# Patient Record
Sex: FEMALE | Race: WHITE
[De-identification: names, ages, dates, MRNs, and addresses within clinical notes are randomized per-mention and may not be internally consistent; named-entity substitution may affect disease eponyms.]

---

## 2018-09-25 ENCOUNTER — HOSPITAL ENCOUNTER (OUTPATIENT)
Age: 23
End: 2018-09-25
Payer: COMMERCIAL

## 2018-09-25 DIAGNOSIS — Z34.90: Primary | ICD-10-CM

## 2018-09-25 PROCEDURE — 87086 URINE CULTURE/COLONY COUNT: CPT

## 2018-09-25 PROCEDURE — 87088 URINE BACTERIA CULTURE: CPT

## 2018-12-06 ENCOUNTER — HOSPITAL ENCOUNTER (OUTPATIENT)
Age: 23
End: 2018-12-06
Payer: MEDICAID

## 2018-12-06 VITALS — BODY MASS INDEX: 23.9 KG/M2

## 2018-12-06 DIAGNOSIS — Z34.90: Primary | ICD-10-CM

## 2018-12-06 LAB
DEPRECATED RDW RBC: 41.4 FL (ref 35.1–43.9)
DIFFERENTIAL INDICATED: (no result)
ERYTHROCYTE [DISTWIDTH] IN BLOOD: 14 % (ref 11.6–14.6)
HCT VFR BLD AUTO: 36.4 % (ref 37–47)
HEMOGLOBIN: 12 G/DL (ref 12–15)
HGB BLD-MCNC: 12 G/DL (ref 12–15)
IMMATURE GRANULOCYTES COUNT: 0.1 X10^3/UL (ref 0–0)
MCV RBC: 81.3 FL (ref 81–99)
MEAN CORP HGB CONC: 33 G/GL (ref 32–36)
MEAN PLATELET VOL.: 9.7 FL (ref 6.2–12)
PLATELET # BLD: 228 K/MM3 (ref 150–450)
PLATELET COUNT: 228 K/MM3 (ref 150–450)
POSITIVE COUNT: NO
POSITIVE DIFFERENTIAL: NO
POSITIVE MORPHOLOGY: NO
RBC # BLD AUTO: 4.48 M/MM3 (ref 4.2–5.4)
RBC DISTRIBUTION WIDTH CV: 14 % (ref 11.6–14.6)
RBC DISTRIBUTION WIDTH SD: 41.4 FL (ref 35.1–43.9)
WBC # BLD AUTO: 11.8 K/MM3 (ref 4.4–11)
WHITE BLOOD COUNT: 11.8 K/MM3 (ref 4.4–11)

## 2018-12-06 PROCEDURE — 36415 COLL VENOUS BLD VENIPUNCTURE: CPT

## 2018-12-06 PROCEDURE — 86703 HIV-1/HIV-2 1 RESULT ANTBDY: CPT

## 2018-12-06 PROCEDURE — 85025 COMPLETE CBC W/AUTO DIFF WBC: CPT

## 2018-12-06 PROCEDURE — 86762 RUBELLA ANTIBODY: CPT

## 2018-12-06 PROCEDURE — 86900 BLOOD TYPING SEROLOGIC ABO: CPT

## 2018-12-06 PROCEDURE — 86850 RBC ANTIBODY SCREEN: CPT

## 2018-12-06 PROCEDURE — 86592 SYPHILIS TEST NON-TREP QUAL: CPT

## 2018-12-06 PROCEDURE — 87340 HEPATITIS B SURFACE AG IA: CPT

## 2018-12-07 LAB — RAPID PLASMIN REAGIN (RPR): NONREACTIVE

## 2019-01-07 ENCOUNTER — HOSPITAL ENCOUNTER (OUTPATIENT)
Age: 24
End: 2019-01-07
Payer: MEDICAID

## 2019-01-07 VITALS — BODY MASS INDEX: 23.9 KG/M2

## 2019-01-07 DIAGNOSIS — Z34.90: Primary | ICD-10-CM

## 2019-01-07 LAB — SAMPLE ADEQUACY CONTROL: (no result)

## 2019-01-07 PROCEDURE — 87591 N.GONORRHOEAE DNA AMP PROB: CPT

## 2019-01-07 PROCEDURE — 87491 CHLMYD TRACH DNA AMP PROBE: CPT

## 2019-02-06 ENCOUNTER — HOSPITAL ENCOUNTER (OUTPATIENT)
Age: 24
End: 2019-02-06
Payer: MEDICAID

## 2019-02-06 VITALS — BODY MASS INDEX: 23.9 KG/M2

## 2019-02-06 DIAGNOSIS — Z34.90: Primary | ICD-10-CM

## 2019-02-06 LAB
CELLS COUNTED: 100
DEPRECATED RDW RBC: 40.4 FL (ref 35.1–43.9)
DIFFERENTIAL INDICATED: MANUAL DIFF
ERYTHROCYTE [DISTWIDTH] IN BLOOD: 13.2 % (ref 11.6–14.6)
GLUCOSE 1H P 50 G GLC PO SERPL-MCNC: 106 MG/DL (ref 70–140)
HCT VFR BLD AUTO: 35.7 % (ref 37–47)
HEMOGLOBIN: 11.4 G/DL (ref 12–15)
HGB BLD-MCNC: 11.4 G/DL (ref 12–15)
MCV RBC: 84.4 FL (ref 81–99)
MEAN CORP HGB CONC: 31.9 G/GL (ref 32–36)
MEAN PLATELET VOL.: 10.6 FL (ref 6.2–12)
NEUTROPHIL-SEGMENTED: 78 % (ref 47–70)
PLATELET # BLD: 240 K/MM3 (ref 150–450)
PLATELET COUNT: 240 K/MM3 (ref 150–450)
POSITIVE COUNT: YES
POSITIVE DIFFERENTIAL: NO
POSITIVE MORPHOLOGY: YES
RBC # BLD AUTO: 4.23 M/MM3 (ref 4.2–5.4)
RBC DISTRIBUTION WIDTH CV: 13.2 % (ref 11.6–14.6)
RBC DISTRIBUTION WIDTH SD: 40.4 FL (ref 35.1–43.9)
RBC MORPH BLD: (no result) NORMAL
RED CELL MORPHOLOGY: (no result) NORMAL
TOTAL CELLS COUNTED: 100
WBC # BLD AUTO: 14.4 K/MM3 (ref 4.4–11)
WHITE BLOOD COUNT: 14.4 K/MM3 (ref 4.4–11)

## 2019-02-06 PROCEDURE — 86900 BLOOD TYPING SEROLOGIC ABO: CPT

## 2019-02-06 PROCEDURE — 85025 COMPLETE CBC W/AUTO DIFF WBC: CPT

## 2019-02-06 PROCEDURE — 36415 COLL VENOUS BLD VENIPUNCTURE: CPT

## 2019-02-06 PROCEDURE — 86850 RBC ANTIBODY SCREEN: CPT

## 2019-02-06 PROCEDURE — 82950 GLUCOSE TEST: CPT

## 2019-03-20 ENCOUNTER — HOSPITAL ENCOUNTER (OUTPATIENT)
Age: 24
End: 2019-03-20
Payer: COMMERCIAL

## 2019-03-20 VITALS
OXYGEN SATURATION: 99 % | SYSTOLIC BLOOD PRESSURE: 108 MMHG | RESPIRATION RATE: 16 BRPM | TEMPERATURE: 98.2 F | DIASTOLIC BLOOD PRESSURE: 75 MMHG | HEART RATE: 132 BPM

## 2019-03-20 VITALS
SYSTOLIC BLOOD PRESSURE: 110 MMHG | RESPIRATION RATE: 16 BRPM | DIASTOLIC BLOOD PRESSURE: 64 MMHG | HEART RATE: 104 BPM | OXYGEN SATURATION: 99 %

## 2019-03-20 VITALS — BODY MASS INDEX: 29.2 KG/M2 | BODY MASS INDEX: 23.9 KG/M2

## 2019-03-20 DIAGNOSIS — E86.0: Primary | ICD-10-CM

## 2019-03-20 PROCEDURE — 96361 HYDRATE IV INFUSION ADD-ON: CPT

## 2019-03-20 PROCEDURE — A4216 STERILE WATER/SALINE, 10 ML: HCPCS

## 2019-03-20 PROCEDURE — 96374 THER/PROPH/DIAG INJ IV PUSH: CPT

## 2019-03-20 PROCEDURE — 96375 TX/PRO/DX INJ NEW DRUG ADDON: CPT

## 2019-04-23 ENCOUNTER — HOSPITAL ENCOUNTER (OUTPATIENT)
Dept: HOSPITAL 100 - LABSPEC | Age: 24
Discharge: HOME | End: 2019-04-23
Payer: MEDICAID

## 2019-04-23 VITALS — BODY MASS INDEX: 30.2 KG/M2

## 2019-04-23 DIAGNOSIS — Z34.90: Primary | ICD-10-CM

## 2019-04-23 PROCEDURE — 87081 CULTURE SCREEN ONLY: CPT

## 2019-04-24 ENCOUNTER — HOSPITAL ENCOUNTER (OUTPATIENT)
Dept: HOSPITAL 100 - WPOUT | Age: 24
LOS: 1 days | Discharge: HOME | End: 2019-04-25
Payer: MEDICAID

## 2019-04-24 VITALS — BODY MASS INDEX: 30.5 KG/M2 | BODY MASS INDEX: 30.2 KG/M2

## 2019-04-24 DIAGNOSIS — O47.9: Primary | ICD-10-CM

## 2019-04-24 DIAGNOSIS — Z3A.00: ICD-10-CM

## 2019-04-24 PROCEDURE — G0378 HOSPITAL OBSERVATION PER HR: HCPCS

## 2019-04-24 PROCEDURE — 99218: CPT

## 2019-04-24 PROCEDURE — 59025 FETAL NON-STRESS TEST: CPT

## 2019-04-24 PROCEDURE — 59050 FETAL MONITOR W/REPORT: CPT

## 2019-05-09 ENCOUNTER — HOSPITAL ENCOUNTER (INPATIENT)
Dept: HOSPITAL 100 - WP | Age: 24
LOS: 3 days | Discharge: HOME | DRG: 560 | End: 2019-05-12
Payer: MEDICAID

## 2019-05-09 VITALS — BODY MASS INDEX: 30.9 KG/M2 | BODY MASS INDEX: 23.9 KG/M2 | BODY MASS INDEX: 30.5 KG/M2

## 2019-05-09 DIAGNOSIS — N85.8: ICD-10-CM

## 2019-05-09 DIAGNOSIS — O34.211: ICD-10-CM

## 2019-05-09 DIAGNOSIS — Z3A.39: ICD-10-CM

## 2019-05-09 LAB
ANISOCYTOSIS BLD QL SMEAR: (no result)
ANISOCYTOSIS: (no result)
CELLS COUNTED: 100
DEPRECATED RDW RBC: 42 FL (ref 35.1–43.9)
DIFFERENTIAL INDICATED: MANUAL DIFF
ERYTHROCYTE [DISTWIDTH] IN BLOOD: 14.5 % (ref 11.6–14.6)
HCT VFR BLD AUTO: 35.1 % (ref 37–47)
HEMOGLOBIN: 11.3 G/DL (ref 12–15)
HGB BLD-MCNC: 11.3 G/DL (ref 12–15)
MCV RBC: 80 FL (ref 81–99)
MEAN CORP HGB CONC: 32.2 G/GL (ref 32–36)
MEAN PLATELET VOL.: 11.3 FL (ref 6.2–12)
MICROCYTOSIS: (no result)
NEUTROPHIL-BAND: 2 % (ref 0–5)
NEUTROPHIL-SEGMENTED: 82 % (ref 47–70)
PLATELET # BLD: 259 K/MM3 (ref 150–450)
PLATELET COUNT: 259 K/MM3 (ref 150–450)
POSITIVE COUNT: YES
POSITIVE DIFFERENTIAL: NO
POSITIVE MORPHOLOGY: YES
RBC # BLD AUTO: 4.39 M/MM3 (ref 4.2–5.4)
RBC DISTRIBUTION WIDTH CV: 14.5 % (ref 11.6–14.6)
RBC DISTRIBUTION WIDTH SD: 42 FL (ref 35.1–43.9)
ROM INTERNAL CONTROL TEST: (no result)
ROM PATIENT TEST: POSITIVE
TOTAL CELLS COUNTED: 100
WBC # BLD AUTO: 18.4 K/MM3 (ref 4.4–11)
WHITE BLOOD COUNT: 18.4 K/MM3 (ref 4.4–11)

## 2019-05-09 PROCEDURE — 99218: CPT

## 2019-05-09 PROCEDURE — 86900 BLOOD TYPING SEROLOGIC ABO: CPT

## 2019-05-09 PROCEDURE — 85025 COMPLETE CBC W/AUTO DIFF WBC: CPT

## 2019-05-09 PROCEDURE — 59050 FETAL MONITOR W/REPORT: CPT

## 2019-05-09 PROCEDURE — 59025 FETAL NON-STRESS TEST: CPT

## 2019-05-09 PROCEDURE — G0378 HOSPITAL OBSERVATION PER HR: HCPCS

## 2019-05-09 PROCEDURE — 84112 EVAL AMNIOTIC FLUID PROTEIN: CPT

## 2019-05-09 PROCEDURE — 86850 RBC ANTIBODY SCREEN: CPT

## 2019-05-09 RX ADMIN — Medication 1 EACH: at 19:53

## 2019-05-10 VITALS
OXYGEN SATURATION: 97 % | RESPIRATION RATE: 20 BRPM | SYSTOLIC BLOOD PRESSURE: 117 MMHG | TEMPERATURE: 100 F | DIASTOLIC BLOOD PRESSURE: 70 MMHG | HEART RATE: 125 BPM

## 2019-05-10 RX ADMIN — Medication 0 ML: at 03:39

## 2019-05-11 VITALS
RESPIRATION RATE: 16 BRPM | HEART RATE: 94 BPM | TEMPERATURE: 98.24 F | OXYGEN SATURATION: 96 % | DIASTOLIC BLOOD PRESSURE: 56 MMHG | SYSTOLIC BLOOD PRESSURE: 105 MMHG

## 2019-05-11 VITALS
TEMPERATURE: 97.1 F | OXYGEN SATURATION: 98 % | SYSTOLIC BLOOD PRESSURE: 110 MMHG | DIASTOLIC BLOOD PRESSURE: 61 MMHG | RESPIRATION RATE: 18 BRPM | HEART RATE: 94 BPM

## 2019-05-11 VITALS
RESPIRATION RATE: 18 BRPM | HEART RATE: 96 BPM | TEMPERATURE: 99 F | DIASTOLIC BLOOD PRESSURE: 65 MMHG | SYSTOLIC BLOOD PRESSURE: 114 MMHG

## 2019-05-11 VITALS
HEART RATE: 88 BPM | RESPIRATION RATE: 16 BRPM | DIASTOLIC BLOOD PRESSURE: 57 MMHG | OXYGEN SATURATION: 96 % | SYSTOLIC BLOOD PRESSURE: 101 MMHG | TEMPERATURE: 98.1 F

## 2019-05-11 VITALS
TEMPERATURE: 98.7 F | SYSTOLIC BLOOD PRESSURE: 111 MMHG | HEART RATE: 117 BPM | DIASTOLIC BLOOD PRESSURE: 67 MMHG | RESPIRATION RATE: 16 BRPM

## 2019-05-11 VITALS
RESPIRATION RATE: 18 BRPM | TEMPERATURE: 97.34 F | DIASTOLIC BLOOD PRESSURE: 63 MMHG | SYSTOLIC BLOOD PRESSURE: 107 MMHG | HEART RATE: 98 BPM

## 2019-05-11 RX ADMIN — DOCUSATE SODIUM 50 MG AND SENNOSIDES 8.6 MG 0 TABLET: 8.6; 5 TABLET, FILM COATED ORAL at 11:23

## 2019-05-12 VITALS
RESPIRATION RATE: 18 BRPM | TEMPERATURE: 98.5 F | SYSTOLIC BLOOD PRESSURE: 118 MMHG | DIASTOLIC BLOOD PRESSURE: 63 MMHG | HEART RATE: 93 BPM

## 2019-05-12 VITALS
TEMPERATURE: 98.42 F | DIASTOLIC BLOOD PRESSURE: 78 MMHG | HEART RATE: 103 BPM | RESPIRATION RATE: 20 BRPM | SYSTOLIC BLOOD PRESSURE: 118 MMHG

## 2019-05-12 RX ADMIN — DOCUSATE SODIUM 50 MG AND SENNOSIDES 8.6 MG 0 TABLET: 8.6; 5 TABLET, FILM COATED ORAL at 09:36

## 2019-06-21 ENCOUNTER — HOSPITAL ENCOUNTER (OUTPATIENT)
Dept: HOSPITAL 100 - LABSPEC | Age: 24
Discharge: HOME | End: 2019-06-21
Payer: COMMERCIAL

## 2019-06-21 VITALS — BODY MASS INDEX: 30.9 KG/M2

## 2019-06-21 DIAGNOSIS — Z12.4: Primary | ICD-10-CM

## 2019-06-21 PROCEDURE — G0145 SCR C/V CYTO,THINLAYER,RESCR: HCPCS

## 2019-06-21 PROCEDURE — 87624 HPV HI-RISK TYP POOLED RSLT: CPT

## 2019-06-21 PROCEDURE — 88175 CYTOPATH C/V AUTO FLUID REDO: CPT

## 2019-10-16 ENCOUNTER — HOSPITAL ENCOUNTER (OUTPATIENT)
Dept: HOSPITAL 100 - US | Age: 24
End: 2019-10-16
Payer: COMMERCIAL

## 2019-10-16 VITALS — BODY MASS INDEX: 30.9 KG/M2

## 2019-10-16 DIAGNOSIS — N89.8: Primary | ICD-10-CM

## 2019-10-16 LAB — SAMPLE ADEQUACY CONTROL: (no result)

## 2019-10-16 PROCEDURE — 93976 VASCULAR STUDY: CPT

## 2019-10-16 PROCEDURE — 87205 SMEAR GRAM STAIN: CPT

## 2019-10-16 PROCEDURE — 76856 US EXAM PELVIC COMPLETE: CPT

## 2019-10-16 PROCEDURE — 87491 CHLMYD TRACH DNA AMP PROBE: CPT

## 2019-10-16 PROCEDURE — 87070 CULTURE OTHR SPECIMN AEROBIC: CPT

## 2019-10-16 PROCEDURE — 76830 TRANSVAGINAL US NON-OB: CPT

## 2019-10-16 PROCEDURE — 87591 N.GONORRHOEAE DNA AMP PROB: CPT

## 2019-10-16 PROCEDURE — 87077 CULTURE AEROBIC IDENTIFY: CPT

## 2020-06-17 ENCOUNTER — HOSPITAL ENCOUNTER (OUTPATIENT)
Age: 25
End: 2020-06-17
Payer: COMMERCIAL

## 2020-06-17 VITALS — BODY MASS INDEX: 30.9 KG/M2

## 2020-06-17 DIAGNOSIS — Z34.90: Primary | ICD-10-CM

## 2020-06-17 LAB
AMPHET UR-MCNC: NEGATIVE NG/ML
BARBITURATE URINE VISTA: NEGATIVE
BENZODIAZEPINE URINE VISTA: NEGATIVE
COCAINE URINE VISTA: NEGATIVE
DEPRECATED RDW RBC: 41.5 FL (ref 35.1–43.9)
DRUG CONFIRMATION TO FOLLOW?: (no result)
ECSTACY URINE VISTA: NEGATIVE
ERYTHROCYTE [DISTWIDTH] IN BLOOD: 14.1 % (ref 11.6–14.6)
HCT VFR BLD AUTO: 36.3 % (ref 37–47)
HEMOGLOBIN: 11.6 G/DL (ref 12–15)
HGB BLD-MCNC: 11.6 G/DL (ref 12–15)
IMMATURE GRANULOCYTES COUNT: 0.08 X10^3/UL (ref 0–0)
MCV RBC: 81.9 FL (ref 81–99)
MEAN CORP HGB CONC: 32 G/DL (ref 32–36)
MEAN PLATELET VOL.: 10.4 FL (ref 6.2–12)
METHADONE URINE VISTA: NEGATIVE
NRBC FLAGGED BY ANALYZER: 0 % (ref 0–5)
PCP UR QL: NEGATIVE
PH UR: 5 [PH]
PLATELET # BLD: 243 K/MM3 (ref 150–450)
PLATELET COUNT: 243 K/MM3 (ref 150–450)
RAPID PLASMIN REAGIN (RPR): NONREACTIVE
RBC # BLD AUTO: 4.43 M/MM3 (ref 4.2–5.4)
RBC DISTRIBUTION WIDTH CV: 14.1 % (ref 11.6–14.6)
RBC DISTRIBUTION WIDTH SD: 41.5 FL (ref 35.1–43.9)
SAMPLE ADEQUACY CONTROL: (no result)
THC URINE VISTA: NEGATIVE
WBC # BLD AUTO: 10.3 K/MM3 (ref 4.4–11)
WHITE BLOOD COUNT: 10.3 K/MM3 (ref 4.4–11)

## 2020-06-17 PROCEDURE — 36415 COLL VENOUS BLD VENIPUNCTURE: CPT

## 2020-06-17 PROCEDURE — 80307 DRUG TEST PRSMV CHEM ANLYZR: CPT

## 2020-06-17 PROCEDURE — 86850 RBC ANTIBODY SCREEN: CPT

## 2020-06-17 PROCEDURE — 85025 COMPLETE CBC W/AUTO DIFF WBC: CPT

## 2020-06-17 PROCEDURE — 86703 HIV-1/HIV-2 1 RESULT ANTBDY: CPT

## 2020-06-17 PROCEDURE — 86900 BLOOD TYPING SEROLOGIC ABO: CPT

## 2020-06-17 PROCEDURE — 86901 BLOOD TYPING SEROLOGIC RH(D): CPT

## 2020-06-17 PROCEDURE — 87340 HEPATITIS B SURFACE AG IA: CPT

## 2020-06-17 PROCEDURE — 87086 URINE CULTURE/COLONY COUNT: CPT

## 2020-06-17 PROCEDURE — 86762 RUBELLA ANTIBODY: CPT

## 2020-06-17 PROCEDURE — 86803 HEPATITIS C AB TEST: CPT

## 2020-06-17 PROCEDURE — 86592 SYPHILIS TEST NON-TREP QUAL: CPT

## 2020-06-17 PROCEDURE — 87491 CHLMYD TRACH DNA AMP PROBE: CPT

## 2020-06-17 PROCEDURE — 87591 N.GONORRHOEAE DNA AMP PROB: CPT

## 2020-10-12 ENCOUNTER — HOSPITAL ENCOUNTER (OUTPATIENT)
Age: 25
End: 2020-10-12
Payer: COMMERCIAL

## 2020-10-12 VITALS — BODY MASS INDEX: 30.9 KG/M2

## 2020-10-12 DIAGNOSIS — Z13.1: ICD-10-CM

## 2020-10-12 DIAGNOSIS — Z34.90: Primary | ICD-10-CM

## 2020-10-12 LAB
CELLS COUNTED: 100
DEPRECATED RDW RBC: 41.1 FL (ref 35.1–43.9)
DIFFERENTIAL INDICATED: MANUAL DIFF
ERYTHROCYTE [DISTWIDTH] IN BLOOD: 14 % (ref 11.6–14.6)
GLUCOSE 1H P 50 G GLC PO SERPL-MCNC: 128 MG/DL (ref 70–140)
HCT VFR BLD AUTO: 34.7 % (ref 37–47)
HEMOGLOBIN: 10.6 G/DL (ref 12–15)
HGB BLD-MCNC: 10.6 G/DL (ref 12–15)
MCV RBC: 82.6 FL (ref 81–99)
MEAN CORP HGB CONC: 30.5 G/DL (ref 32–36)
MEAN PLATELET VOL.: 10.3 FL (ref 6.2–12)
NEUTROPHIL-BAND: 8 % (ref 0–5)
NEUTROPHIL-SEGMENTED: 59 % (ref 47–70)
PLATELET # BLD: 211 K/MM3 (ref 150–450)
PLATELET COUNT: 211 K/MM3 (ref 150–450)
POSITIVE COUNT: YES
POSITIVE MORPHOLOGY: YES
RBC # BLD AUTO: 4.2 M/MM3 (ref 4.2–5.4)
RBC DISTRIBUTION WIDTH CV: 14 % (ref 11.6–14.6)
RBC DISTRIBUTION WIDTH SD: 41.1 FL (ref 35.1–43.9)
RBC MORPH BLD: (no result) NORMAL
RED CELL MORPHOLOGY: (no result) NORMAL
TOTAL CELLS COUNTED: 100
WBC # BLD AUTO: 10.8 K/MM3 (ref 4.4–11)
WHITE BLOOD COUNT: 10.8 K/MM3 (ref 4.4–11)

## 2020-10-12 PROCEDURE — 36415 COLL VENOUS BLD VENIPUNCTURE: CPT

## 2020-10-12 PROCEDURE — 82950 GLUCOSE TEST: CPT

## 2020-10-12 PROCEDURE — 85025 COMPLETE CBC W/AUTO DIFF WBC: CPT

## 2020-11-18 ENCOUNTER — HOSPITAL ENCOUNTER (OUTPATIENT)
Age: 25
End: 2020-11-18
Payer: COMMERCIAL

## 2020-11-18 VITALS — BODY MASS INDEX: 32.6 KG/M2

## 2020-11-18 DIAGNOSIS — Z3A.00: ICD-10-CM

## 2020-11-18 DIAGNOSIS — O99.013: Primary | ICD-10-CM

## 2020-11-18 LAB
CELLS COUNTED: 100
DEPRECATED RDW RBC: 47.2 FL (ref 35.1–43.9)
DIFFERENTIAL INDICATED: MANUAL DIFF
ERYTHROCYTE [DISTWIDTH] IN BLOOD: 15.6 % (ref 11.6–14.6)
HCT VFR BLD AUTO: 36.2 % (ref 37–47)
HEMOGLOBIN: 11.1 G/DL (ref 12–15)
HGB BLD-MCNC: 11.1 G/DL (ref 12–15)
MCV RBC: 83.2 FL (ref 81–99)
MEAN CORP HGB CONC: 30.7 G/DL (ref 32–36)
MEAN PLATELET VOL.: 10.5 FL (ref 6.2–12)
NEUTROPHIL-BAND: 1 % (ref 0–5)
NEUTROPHIL-SEGMENTED: 46 % (ref 47–70)
PLATELET # BLD: 201 K/MM3 (ref 150–450)
PLATELET COUNT: 201 K/MM3 (ref 150–450)
POSITIVE COUNT: YES
POSITIVE MORPHOLOGY: YES
RBC # BLD AUTO: 4.35 M/MM3 (ref 4.2–5.4)
RBC DISTRIBUTION WIDTH CV: 15.6 % (ref 11.6–14.6)
RBC DISTRIBUTION WIDTH SD: 47.2 FL (ref 35.1–43.9)
RBC MORPH BLD: (no result) NORMAL
RED CELL MORPHOLOGY: (no result) NORMAL
TOTAL CELLS COUNTED: 100
WBC # BLD AUTO: 12.7 K/MM3 (ref 4.4–11)
WHITE BLOOD COUNT: 12.7 K/MM3 (ref 4.4–11)

## 2020-11-18 PROCEDURE — 36415 COLL VENOUS BLD VENIPUNCTURE: CPT

## 2020-11-18 PROCEDURE — 85025 COMPLETE CBC W/AUTO DIFF WBC: CPT

## 2020-12-04 ENCOUNTER — HOSPITAL ENCOUNTER (OUTPATIENT)
Dept: HOSPITAL 100 - WPOUT | Age: 25
Discharge: HOME | End: 2020-12-04
Payer: COMMERCIAL

## 2020-12-04 VITALS — BODY MASS INDEX: 33 KG/M2 | BODY MASS INDEX: 33.5 KG/M2

## 2020-12-04 DIAGNOSIS — O26.893: Primary | ICD-10-CM

## 2020-12-04 DIAGNOSIS — Z3A.35: ICD-10-CM

## 2020-12-04 DIAGNOSIS — R51.9: ICD-10-CM

## 2020-12-04 DIAGNOSIS — H53.8: ICD-10-CM

## 2020-12-04 DIAGNOSIS — O23.43: ICD-10-CM

## 2020-12-04 LAB
ALANINE AMINOTRANSFER ALT/SGPT: 14 U/L (ref 13–56)
AST(SGOT): 9 U/L (ref 15–37)
CREAT UR-MCNC: 29.2 MG/DL
DEPRECATED RDW RBC: 45.4 FL (ref 35.1–43.9)
ERYTHROCYTE [DISTWIDTH] IN BLOOD: 15.2 % (ref 11.6–14.6)
EST GLOM FILT RATE - AFR AMER: 254 ML/MIN (ref 60–?)
HCT VFR BLD AUTO: 34.5 % (ref 37–47)
HEMOGLOBIN: 11 G/DL (ref 12–15)
HGB BLD-MCNC: 11 G/DL (ref 12–15)
LEUKOCYTE ESTERASE UR QL STRIP: 500 /UL
MCV RBC: 82.3 FL (ref 81–99)
MEAN CORP HGB CONC: 31.9 G/DL (ref 32–36)
MEAN PLATELET VOL.: 10.5 FL (ref 6.2–12)
PLATELET # BLD: 206 K/MM3 (ref 150–450)
PLATELET COUNT: 206 K/MM3 (ref 150–450)
PROT UR-MCNC: < 6 MG/DL (ref ?–11.9)
PROT/CREAT UR: (no result) MG/G CRE (ref 0–200)
RBC # BLD AUTO: 4.19 M/MM3 (ref 4.2–5.4)
RBC DISTRIBUTION WIDTH CV: 15.2 % (ref 11.6–14.6)
RBC DISTRIBUTION WIDTH SD: 45.4 FL (ref 35.1–43.9)
SP GR UR: 1 (ref 1–1.03)
URATE SERPL-MCNC: 3.7 MG/DL (ref 2.6–6)
URINE PRESERVATIVE: (no result)
WBC # BLD AUTO: 11.6 K/MM3 (ref 4.4–11)
WHITE BLOOD COUNT: 11.6 K/MM3 (ref 4.4–11)

## 2020-12-04 PROCEDURE — 84550 ASSAY OF BLOOD/URIC ACID: CPT

## 2020-12-04 PROCEDURE — 85027 COMPLETE CBC AUTOMATED: CPT

## 2020-12-04 PROCEDURE — G0378 HOSPITAL OBSERVATION PER HR: HCPCS

## 2020-12-04 PROCEDURE — 82570 ASSAY OF URINE CREATININE: CPT

## 2020-12-04 PROCEDURE — 59050 FETAL MONITOR W/REPORT: CPT

## 2020-12-04 PROCEDURE — 59025 FETAL NON-STRESS TEST: CPT

## 2020-12-04 PROCEDURE — 82565 ASSAY OF CREATININE: CPT

## 2020-12-04 PROCEDURE — 84460 ALANINE AMINO (ALT) (SGPT): CPT

## 2020-12-04 PROCEDURE — 84450 TRANSFERASE (AST) (SGOT): CPT

## 2020-12-04 PROCEDURE — 81002 URINALYSIS NONAUTO W/O SCOPE: CPT

## 2020-12-04 PROCEDURE — 36415 COLL VENOUS BLD VENIPUNCTURE: CPT

## 2020-12-04 PROCEDURE — 99218: CPT

## 2020-12-04 PROCEDURE — 84156 ASSAY OF PROTEIN URINE: CPT

## 2020-12-17 ENCOUNTER — HOSPITAL ENCOUNTER (OUTPATIENT)
Dept: HOSPITAL 100 - LABSPEC | Age: 25
Discharge: HOME | End: 2020-12-17
Payer: COMMERCIAL

## 2020-12-17 VITALS — BODY MASS INDEX: 33.6 KG/M2

## 2020-12-17 DIAGNOSIS — Z3A.00: ICD-10-CM

## 2020-12-17 DIAGNOSIS — O23.40: Primary | ICD-10-CM

## 2020-12-17 PROCEDURE — 87086 URINE CULTURE/COLONY COUNT: CPT

## 2020-12-17 PROCEDURE — 87081 CULTURE SCREEN ONLY: CPT

## 2021-01-25 ENCOUNTER — HOSPITAL ENCOUNTER (OUTPATIENT)
Dept: HOSPITAL 100 - LABSPEC | Age: 26
Discharge: HOME | End: 2021-01-25
Payer: COMMERCIAL

## 2021-01-25 VITALS — BODY MASS INDEX: 33.6 KG/M2

## 2021-01-25 DIAGNOSIS — N39.0: Primary | ICD-10-CM

## 2021-01-25 PROCEDURE — 87086 URINE CULTURE/COLONY COUNT: CPT

## 2021-01-25 PROCEDURE — 87088 URINE BACTERIA CULTURE: CPT

## 2021-08-09 ENCOUNTER — HOSPITAL ENCOUNTER (OUTPATIENT)
Age: 26
Discharge: HOME | End: 2021-08-09
Payer: COMMERCIAL

## 2021-08-09 VITALS — BODY MASS INDEX: 33.6 KG/M2

## 2021-08-09 DIAGNOSIS — R30.9: Primary | ICD-10-CM

## 2021-08-09 DIAGNOSIS — R10.2: ICD-10-CM

## 2021-08-09 PROCEDURE — 87088 URINE BACTERIA CULTURE: CPT

## 2021-08-09 PROCEDURE — 87086 URINE CULTURE/COLONY COUNT: CPT

## 2021-08-09 PROCEDURE — 87070 CULTURE OTHR SPECIMN AEROBIC: CPT

## 2021-08-09 PROCEDURE — 87205 SMEAR GRAM STAIN: CPT

## 2022-02-16 ENCOUNTER — HOSPITAL ENCOUNTER (OUTPATIENT)
Age: 27
Discharge: HOME | End: 2022-02-16
Payer: COMMERCIAL

## 2022-02-16 DIAGNOSIS — N91.2: Primary | ICD-10-CM

## 2022-02-16 LAB — HCG SERPL QL: < 1 MIU/ML (ref 1–3)

## 2022-02-16 PROCEDURE — 84702 CHORIONIC GONADOTROPIN TEST: CPT

## 2022-02-16 PROCEDURE — 36415 COLL VENOUS BLD VENIPUNCTURE: CPT

## 2022-04-13 ENCOUNTER — HOSPITAL ENCOUNTER (OUTPATIENT)
Dept: HOSPITAL 100 - LAB | Age: 27
Discharge: HOME | End: 2022-04-13
Payer: COMMERCIAL

## 2022-04-13 DIAGNOSIS — N92.6: Primary | ICD-10-CM

## 2022-04-13 LAB — HCG SERPL QL: 9 MIU/ML (ref 1–3)

## 2022-04-13 PROCEDURE — 84702 CHORIONIC GONADOTROPIN TEST: CPT

## 2022-04-15 ENCOUNTER — HOSPITAL ENCOUNTER (OUTPATIENT)
Dept: HOSPITAL 100 - PAVLAB | Age: 27
Discharge: HOME | End: 2022-04-15
Payer: COMMERCIAL

## 2022-04-15 DIAGNOSIS — N91.2: Primary | ICD-10-CM

## 2022-04-15 LAB — HCG SERPL QL: 39 MIU/ML (ref 1–3)

## 2022-04-15 PROCEDURE — 84702 CHORIONIC GONADOTROPIN TEST: CPT

## 2022-04-15 PROCEDURE — 36415 COLL VENOUS BLD VENIPUNCTURE: CPT

## 2022-05-05 ENCOUNTER — HOSPITAL ENCOUNTER (OUTPATIENT)
Dept: HOSPITAL 100 - PAVLAB | Age: 27
Discharge: HOME | End: 2022-05-05
Payer: COMMERCIAL

## 2022-05-05 DIAGNOSIS — N91.2: Primary | ICD-10-CM

## 2022-05-05 LAB — HCG SERPL QL: 788 MIU/ML (ref 1–3)

## 2022-05-05 PROCEDURE — 36415 COLL VENOUS BLD VENIPUNCTURE: CPT

## 2022-05-05 PROCEDURE — 84702 CHORIONIC GONADOTROPIN TEST: CPT

## 2022-05-09 ENCOUNTER — HOSPITAL ENCOUNTER (OUTPATIENT)
Dept: HOSPITAL 100 - PAVLAB | Age: 27
Discharge: HOME | End: 2022-05-09
Payer: COMMERCIAL

## 2022-05-09 DIAGNOSIS — N91.2: Primary | ICD-10-CM

## 2022-05-09 LAB — HCG SERPL QL: 75 MIU/ML (ref 1–3)

## 2022-05-09 PROCEDURE — 36415 COLL VENOUS BLD VENIPUNCTURE: CPT

## 2022-05-09 PROCEDURE — 84702 CHORIONIC GONADOTROPIN TEST: CPT

## 2022-05-17 ENCOUNTER — HOSPITAL ENCOUNTER (OUTPATIENT)
Dept: HOSPITAL 100 - PAVLAB | Age: 27
Discharge: HOME | End: 2022-05-17
Payer: COMMERCIAL

## 2022-05-17 DIAGNOSIS — O03.9: Primary | ICD-10-CM

## 2022-05-17 LAB — HCG SERPL QL: 5 MIU/ML (ref 1–3)

## 2022-05-17 PROCEDURE — 36415 COLL VENOUS BLD VENIPUNCTURE: CPT

## 2022-05-17 PROCEDURE — 84702 CHORIONIC GONADOTROPIN TEST: CPT

## 2022-10-20 ENCOUNTER — HOSPITAL ENCOUNTER (OUTPATIENT)
Dept: HOSPITAL 100 - LABSPEC | Age: 27
Discharge: HOME | End: 2022-10-20
Payer: COMMERCIAL

## 2022-10-20 DIAGNOSIS — Z12.4: Primary | ICD-10-CM

## 2022-10-20 PROCEDURE — G0145 SCR C/V CYTO,THINLAYER,RESCR: HCPCS

## 2022-10-20 PROCEDURE — 88175 CYTOPATH C/V AUTO FLUID REDO: CPT

## 2023-02-08 ENCOUNTER — HOSPITAL ENCOUNTER (OUTPATIENT)
Dept: HOSPITAL 100 - LAB | Age: 28
Discharge: HOME | End: 2023-02-08
Payer: COMMERCIAL

## 2023-02-08 DIAGNOSIS — N91.2: Primary | ICD-10-CM

## 2023-02-08 LAB — HCG SERPL QL: 26 MIU/ML (ref 1–3)

## 2023-02-08 PROCEDURE — 84702 CHORIONIC GONADOTROPIN TEST: CPT

## 2023-02-08 PROCEDURE — 36415 COLL VENOUS BLD VENIPUNCTURE: CPT

## 2023-02-10 ENCOUNTER — HOSPITAL ENCOUNTER (OUTPATIENT)
Dept: HOSPITAL 100 - LAB | Age: 28
Discharge: HOME | End: 2023-02-10
Payer: COMMERCIAL

## 2023-02-10 DIAGNOSIS — N91.2: Primary | ICD-10-CM

## 2023-02-10 LAB — HCG SERPL QL: 107 MIU/ML (ref 1–3)

## 2023-02-10 PROCEDURE — 84702 CHORIONIC GONADOTROPIN TEST: CPT

## 2023-02-10 PROCEDURE — 36415 COLL VENOUS BLD VENIPUNCTURE: CPT

## 2023-02-16 ENCOUNTER — HOSPITAL ENCOUNTER (OUTPATIENT)
Dept: HOSPITAL 100 - US | Age: 28
Discharge: HOME | End: 2023-02-16
Payer: COMMERCIAL

## 2023-02-16 DIAGNOSIS — N91.2: Primary | ICD-10-CM

## 2023-02-16 PROCEDURE — 76817 TRANSVAGINAL US OBSTETRIC: CPT

## 2023-02-18 ENCOUNTER — HOSPITAL ENCOUNTER (OUTPATIENT)
Age: 28
Discharge: HOME | End: 2023-02-18
Payer: COMMERCIAL

## 2023-02-18 DIAGNOSIS — N91.2: Primary | ICD-10-CM

## 2023-02-18 LAB — HCG SERPL QL: 4001 MIU/ML (ref 1–3)

## 2023-02-18 PROCEDURE — 84702 CHORIONIC GONADOTROPIN TEST: CPT

## 2023-02-20 ENCOUNTER — HOSPITAL ENCOUNTER (OUTPATIENT)
Dept: HOSPITAL 100 - LAB | Age: 28
Discharge: HOME | End: 2023-02-20
Payer: COMMERCIAL

## 2023-02-20 DIAGNOSIS — N91.2: Primary | ICD-10-CM

## 2023-02-20 LAB — HCG SERPL QL: 7284 MIU/ML (ref 1–3)

## 2023-02-20 PROCEDURE — 84702 CHORIONIC GONADOTROPIN TEST: CPT

## 2023-02-20 PROCEDURE — 36415 COLL VENOUS BLD VENIPUNCTURE: CPT

## 2023-03-01 ENCOUNTER — HOSPITAL ENCOUNTER (OUTPATIENT)
Dept: HOSPITAL 100 - US | Age: 28
Discharge: HOME | End: 2023-03-01
Payer: COMMERCIAL

## 2023-03-01 DIAGNOSIS — N91.2: Primary | ICD-10-CM

## 2023-03-01 PROCEDURE — 76817 TRANSVAGINAL US OBSTETRIC: CPT

## 2023-03-02 ENCOUNTER — HOSPITAL ENCOUNTER (OUTPATIENT)
Dept: HOSPITAL 100 - LABSPEC | Age: 28
Discharge: HOME | End: 2023-03-02
Payer: COMMERCIAL

## 2023-03-02 DIAGNOSIS — Z34.90: Primary | ICD-10-CM

## 2023-03-02 PROCEDURE — 87491 CHLMYD TRACH DNA AMP PROBE: CPT

## 2023-03-02 PROCEDURE — 87591 N.GONORRHOEAE DNA AMP PROB: CPT

## 2023-03-30 ENCOUNTER — HOSPITAL ENCOUNTER (OUTPATIENT)
Dept: HOSPITAL 100 - PAVLAB | Age: 28
Discharge: HOME | End: 2023-03-30
Payer: COMMERCIAL

## 2023-03-30 DIAGNOSIS — Z13.1: ICD-10-CM

## 2023-03-30 DIAGNOSIS — O09.90: Primary | ICD-10-CM

## 2023-03-30 DIAGNOSIS — Z3A.00: ICD-10-CM

## 2023-03-30 LAB
DEPRECATED RDW RBC: 41.3 FL (ref 35.1–43.9)
ERYTHROCYTE [DISTWIDTH] IN BLOOD: 14.3 % (ref 11.6–14.6)
GLUCOSE 1H P 50 G GLC PO SERPL-MCNC: 167 MG/DL (ref 70–140)
HCT VFR BLD AUTO: 35.5 % (ref 37–47)
HEMOGLOBIN: 11.3 G/DL (ref 12–15)
HGB BLD-MCNC: 11.3 G/DL (ref 12–15)
IMMATURE GRANULOCYTES COUNT: 0.08 X10^3/UL (ref 0–0)
MCV RBC: 78.9 FL (ref 81–99)
MEAN CORP HGB CONC: 31.8 G/DL (ref 32–36)
MEAN PLATELET VOL.: 9.9 FL (ref 6.2–12)
NRBC FLAGGED BY ANALYZER: 0 % (ref 0–5)
PLATELET # BLD: 240 K/MM3 (ref 150–450)
PLATELET COUNT: 240 K/MM3 (ref 150–450)
PROGEST SERPL-MCNC: 12.69 NG/ML
RBC # BLD AUTO: 4.5 M/MM3 (ref 4.2–5.4)
RBC DISTRIBUTION WIDTH CV: 14.3 % (ref 11.6–14.6)
RBC DISTRIBUTION WIDTH SD: 41.3 FL (ref 35.1–43.9)
WBC # BLD AUTO: 8.3 K/MM3 (ref 4.4–11)
WHITE BLOOD COUNT: 8.3 K/MM3 (ref 4.4–11)

## 2023-03-30 PROCEDURE — 87086 URINE CULTURE/COLONY COUNT: CPT

## 2023-03-30 PROCEDURE — 87088 URINE BACTERIA CULTURE: CPT

## 2023-03-30 PROCEDURE — 85025 COMPLETE CBC W/AUTO DIFF WBC: CPT

## 2023-03-30 PROCEDURE — 86780 TREPONEMA PALLIDUM: CPT

## 2023-03-30 PROCEDURE — 86850 RBC ANTIBODY SCREEN: CPT

## 2023-03-30 PROCEDURE — 82950 GLUCOSE TEST: CPT

## 2023-03-30 PROCEDURE — 36415 COLL VENOUS BLD VENIPUNCTURE: CPT

## 2023-03-30 PROCEDURE — 84144 ASSAY OF PROGESTERONE: CPT

## 2023-03-30 PROCEDURE — 86703 HIV-1/HIV-2 1 RESULT ANTBDY: CPT

## 2023-03-30 PROCEDURE — 86762 RUBELLA ANTIBODY: CPT

## 2023-03-30 PROCEDURE — 86803 HEPATITIS C AB TEST: CPT

## 2023-03-30 PROCEDURE — 87340 HEPATITIS B SURFACE AG IA: CPT

## 2023-03-30 PROCEDURE — 86900 BLOOD TYPING SEROLOGIC ABO: CPT

## 2023-03-30 PROCEDURE — 86901 BLOOD TYPING SEROLOGIC RH(D): CPT

## 2023-04-11 ENCOUNTER — HOSPITAL ENCOUNTER (OUTPATIENT)
Dept: HOSPITAL 100 - LAB | Age: 28
Discharge: HOME | End: 2023-04-11
Payer: COMMERCIAL

## 2023-04-11 DIAGNOSIS — Z13.1: Primary | ICD-10-CM

## 2023-04-11 LAB
GLUCOSE GTT-GESTATION. FASTING: 109 MG/DL (ref ?–105)
GLUCOSE GTT-GESTATIONAL 1 HR: 193 MG/DL (ref ?–190)
GLUCOSE GTT-GESTATIONAL 2 HR: 161 MG/DL (ref ?–165)
GLUCOSE GTT-GESTATIONAL 3 HR: 140 L (ref ?–145)

## 2023-04-11 PROCEDURE — 82952 GTT-ADDED SAMPLES: CPT

## 2023-04-11 PROCEDURE — 36415 COLL VENOUS BLD VENIPUNCTURE: CPT

## 2023-04-11 PROCEDURE — 82951 GLUCOSE TOLERANCE TEST (GTT): CPT

## 2023-04-14 ENCOUNTER — HOSPITAL ENCOUNTER (EMERGENCY)
Age: 28
Discharge: HOME | End: 2023-04-14
Payer: COMMERCIAL

## 2023-04-14 VITALS
SYSTOLIC BLOOD PRESSURE: 130 MMHG | DIASTOLIC BLOOD PRESSURE: 76 MMHG | HEART RATE: 134 BPM | TEMPERATURE: 97.9 F | RESPIRATION RATE: 18 BRPM | OXYGEN SATURATION: 96 %

## 2023-04-14 VITALS
SYSTOLIC BLOOD PRESSURE: 108 MMHG | OXYGEN SATURATION: 100 % | RESPIRATION RATE: 16 BRPM | HEART RATE: 101 BPM | DIASTOLIC BLOOD PRESSURE: 47 MMHG

## 2023-04-14 VITALS
DIASTOLIC BLOOD PRESSURE: 54 MMHG | HEART RATE: 101 BPM | SYSTOLIC BLOOD PRESSURE: 108 MMHG | RESPIRATION RATE: 16 BRPM | OXYGEN SATURATION: 100 %

## 2023-04-14 VITALS — HEART RATE: 117 BPM | DIASTOLIC BLOOD PRESSURE: 62 MMHG | SYSTOLIC BLOOD PRESSURE: 104 MMHG

## 2023-04-14 VITALS — BODY MASS INDEX: 33.8 KG/M2

## 2023-04-14 DIAGNOSIS — F41.8: ICD-10-CM

## 2023-04-14 DIAGNOSIS — O21.9: Primary | ICD-10-CM

## 2023-04-14 DIAGNOSIS — Z3A.13: ICD-10-CM

## 2023-04-14 DIAGNOSIS — O99.341: ICD-10-CM

## 2023-04-14 LAB
ANION GAP: 6 (ref 5–15)
BUN SERPL-MCNC: 7 MG/DL (ref 7–18)
BUN/CREAT RATIO: 14.6 RATIO (ref 10–20)
CALCIUM SERPL-MCNC: 9 MG/DL (ref 8.5–10.1)
CARBON DIOXIDE: 23 MMOL/L (ref 21–32)
CHLORIDE: 106 MMOL/L (ref 98–107)
DEPRECATED RDW RBC: 40.7 FL (ref 35.1–43.9)
ERYTHROCYTE [DISTWIDTH] IN BLOOD: 14.2 % (ref 11.6–14.6)
EST GLOM FILT RATE - AFR AMER: 199 ML/MIN (ref 60–?)
ESTIMATED CREATININE CLEARANCE: 152.02 ML/MIN
GLUCOSE: 95 MG/DL (ref 74–106)
HCT VFR BLD AUTO: 36.2 % (ref 37–47)
HEMOGLOBIN: 11.4 G/DL (ref 12–15)
HGB BLD-MCNC: 11.4 G/DL (ref 12–15)
IMMATURE GRANULOCYTES COUNT: 0.09 X10^3/UL (ref 0–0)
KETONE-DIPSTICK: 50 MG/DL
LEUKOCYTE ESTERASE UR QL STRIP: 25 /UL
MCV RBC: 79 FL (ref 81–99)
MEAN CORP HGB CONC: 31.5 G/DL (ref 32–36)
MEAN PLATELET VOL.: 10.2 FL (ref 6.2–12)
MUCOUS THREADS URNS QL MICRO: (no result) /HPF
NRBC FLAGGED BY ANALYZER: 0 % (ref 0–5)
PLATELET # BLD: 211 K/MM3 (ref 150–450)
PLATELET COUNT: 211 K/MM3 (ref 150–450)
POTASSIUM: 3.6 MMOL/L (ref 3.5–5.1)
PROT UR QL STRIP.AUTO: 15 MG/DL
RBC # BLD AUTO: 4.58 M/MM3 (ref 4.2–5.4)
RBC DISTRIBUTION WIDTH CV: 14.2 % (ref 11.6–14.6)
RBC DISTRIBUTION WIDTH SD: 40.7 FL (ref 35.1–43.9)
RBC UR QL: (no result) /HPF (ref 0–5)
RBC UR QL: 10 /UL
SP GR UR: 1.01 (ref 1–1.03)
SQUAMOUS URNS QL MICRO: (no result) /HPF (ref 5–10)
URINE PRESERVATIVE: (no result)
WBC # BLD AUTO: 8.1 K/MM3 (ref 4.4–11)
WHITE BLOOD COUNT: 8.1 K/MM3 (ref 4.4–11)

## 2023-04-14 PROCEDURE — 81001 URINALYSIS AUTO W/SCOPE: CPT

## 2023-04-14 PROCEDURE — 96374 THER/PROPH/DIAG INJ IV PUSH: CPT

## 2023-04-14 PROCEDURE — 85025 COMPLETE CBC W/AUTO DIFF WBC: CPT

## 2023-04-14 PROCEDURE — 96361 HYDRATE IV INFUSION ADD-ON: CPT

## 2023-04-14 PROCEDURE — 80048 BASIC METABOLIC PNL TOTAL CA: CPT

## 2023-04-14 PROCEDURE — 99285 EMERGENCY DEPT VISIT HI MDM: CPT

## 2023-04-14 PROCEDURE — 84702 CHORIONIC GONADOTROPIN TEST: CPT

## 2023-04-24 ENCOUNTER — HOSPITAL ENCOUNTER (OUTPATIENT)
Dept: HOSPITAL 100 - DC | Age: 28
LOS: 6 days | End: 2023-04-30
Payer: COMMERCIAL

## 2023-04-24 DIAGNOSIS — O24.419: Primary | ICD-10-CM

## 2023-04-24 DIAGNOSIS — Z71.3: ICD-10-CM

## 2023-04-24 PROCEDURE — 97802 MEDICAL NUTRITION INDIV IN: CPT

## 2023-05-01 ENCOUNTER — HOSPITAL ENCOUNTER (OUTPATIENT)
Dept: HOSPITAL 100 - DC | Age: 28
LOS: 30 days | End: 2023-05-31
Payer: COMMERCIAL

## 2023-05-01 DIAGNOSIS — O24.419: Primary | ICD-10-CM

## 2023-05-01 DIAGNOSIS — Z71.3: ICD-10-CM

## 2023-05-01 PROCEDURE — 97803 MED NUTRITION INDIV SUBSEQ: CPT

## 2023-07-31 ENCOUNTER — HOSPITAL ENCOUNTER (OUTPATIENT)
Age: 28
Discharge: HOME | End: 2023-07-31
Payer: COMMERCIAL

## 2023-07-31 DIAGNOSIS — O24.319: Primary | ICD-10-CM

## 2023-07-31 DIAGNOSIS — Z3A.23: ICD-10-CM

## 2023-07-31 LAB
ALANINE AMINOTRANSFER ALT/SGPT: 11 U/L (ref 13–56)
ALBUMIN SERPL-MCNC: 2.5 G/DL (ref 3.2–5)
ALKALINE PHOSPHATASE: 112 U/L (ref 45–117)
ANION GAP: 9 (ref 5–15)
AST(SGOT): 12 U/L (ref 15–37)
BUN SERPL-MCNC: 3 MG/DL (ref 7–18)
BUN/CREAT RATIO: 6.1 RATIO (ref 10–20)
CALCIUM SERPL-MCNC: 9 MG/DL (ref 8.5–10.1)
CARBON DIOXIDE: 22 MMOL/L (ref 21–32)
CHLORIDE: 107 MMOL/L (ref 98–107)
DEPRECATED RDW RBC: 40.5 FL (ref 35.1–43.9)
ERYTHROCYTE [DISTWIDTH] IN BLOOD: 14.4 % (ref 11.6–14.6)
EST GLOM FILT RATE - AFR AMER: 193 ML/MIN (ref 60–?)
GLOBULIN: 4.7 G/DL (ref 2.2–4.2)
GLUCOSE: 107 MG/DL (ref 74–106)
HCT VFR BLD AUTO: 32 % (ref 37–47)
HEMOGLOBIN: 9.9 G/DL (ref 12–15)
HGB BLD-MCNC: 9.9 G/DL (ref 12–15)
IMMATURE GRANULOCYTES COUNT: 0.51 X10^3/UL (ref 0–0)
MCV RBC: 78.2 FL (ref 81–99)
MEAN CORP HGB CONC: 30.9 G/DL (ref 32–36)
MEAN PLATELET VOL.: 11 FL (ref 6.2–12)
NRBC FLAGGED BY ANALYZER: 0 % (ref 0–5)
PLATELET # BLD: 273 K/MM3 (ref 150–450)
PLATELET COUNT: 273 K/MM3 (ref 150–450)
POTASSIUM: 3.6 MMOL/L (ref 3.5–5.1)
RBC # BLD AUTO: 4.09 M/MM3 (ref 4.2–5.4)
RBC DISTRIBUTION WIDTH CV: 14.4 % (ref 11.6–14.6)
RBC DISTRIBUTION WIDTH SD: 40.5 FL (ref 35.1–43.9)
WBC # BLD AUTO: 13.2 K/MM3 (ref 4.4–11)
WHITE BLOOD COUNT: 13.2 K/MM3 (ref 4.4–11)

## 2023-07-31 PROCEDURE — 86703 HIV-1/HIV-2 1 RESULT ANTBDY: CPT

## 2023-07-31 PROCEDURE — 85025 COMPLETE CBC W/AUTO DIFF WBC: CPT

## 2023-07-31 PROCEDURE — 36415 COLL VENOUS BLD VENIPUNCTURE: CPT

## 2023-07-31 PROCEDURE — 86780 TREPONEMA PALLIDUM: CPT

## 2023-07-31 PROCEDURE — 84443 ASSAY THYROID STIM HORMONE: CPT

## 2023-07-31 PROCEDURE — 80053 COMPREHEN METABOLIC PANEL: CPT

## 2023-08-05 ENCOUNTER — HOSPITAL ENCOUNTER (OUTPATIENT)
Dept: HOSPITAL 100 - LAB | Age: 28
Discharge: HOME | End: 2023-08-05
Payer: COMMERCIAL

## 2023-08-05 VITALS — OXYGEN SATURATION: 96 % | HEART RATE: 130 BPM

## 2023-08-05 VITALS
TEMPERATURE: 99 F | SYSTOLIC BLOOD PRESSURE: 119 MMHG | DIASTOLIC BLOOD PRESSURE: 64 MMHG | OXYGEN SATURATION: 99 % | HEART RATE: 100 BPM

## 2023-08-05 VITALS — BODY MASS INDEX: 34.3 KG/M2

## 2023-08-05 DIAGNOSIS — Z34.83: Primary | ICD-10-CM

## 2023-08-05 LAB
FERRITIN SERPL-MCNC: 8 NG/ML (ref 8–252)
IRON BINDING CAPACITY,TOTAL: 633 UG/DL (ref 250–450)
IRON SATN MFR SERPL: 7.7 % (ref 15–55)
IRON SPEC-MCNT: 49 UG/DL (ref 50–170)

## 2023-08-05 PROCEDURE — 82728 ASSAY OF FERRITIN: CPT

## 2023-08-05 PROCEDURE — 59025 FETAL NON-STRESS TEST: CPT

## 2023-08-05 PROCEDURE — 59050 FETAL MONITOR W/REPORT: CPT

## 2023-08-05 PROCEDURE — 36415 COLL VENOUS BLD VENIPUNCTURE: CPT

## 2023-08-05 PROCEDURE — 99221 1ST HOSP IP/OBS SF/LOW 40: CPT

## 2023-08-05 PROCEDURE — 83540 ASSAY OF IRON: CPT

## 2023-08-05 PROCEDURE — G0378 HOSPITAL OBSERVATION PER HR: HCPCS

## 2023-08-05 PROCEDURE — 83550 IRON BINDING TEST: CPT

## 2023-08-22 ENCOUNTER — HOSPITAL ENCOUNTER (OUTPATIENT)
Dept: HOSPITAL 100 - US | Age: 28
Discharge: HOME | End: 2023-08-22
Payer: COMMERCIAL

## 2023-08-22 DIAGNOSIS — Z3A.00: ICD-10-CM

## 2023-08-22 DIAGNOSIS — O24.319: Primary | ICD-10-CM

## 2023-08-22 PROCEDURE — 76816 OB US FOLLOW-UP PER FETUS: CPT

## 2023-09-19 ENCOUNTER — HOSPITAL ENCOUNTER (OUTPATIENT)
Dept: HOSPITAL 100 - OPUS | Age: 28
Discharge: HOME | End: 2023-09-19
Payer: COMMERCIAL

## 2023-09-19 DIAGNOSIS — O24.319: Primary | ICD-10-CM

## 2023-09-19 DIAGNOSIS — Z3A.00: ICD-10-CM

## 2023-09-19 PROCEDURE — 76816 OB US FOLLOW-UP PER FETUS: CPT

## 2023-09-21 ENCOUNTER — HOSPITAL ENCOUNTER (OUTPATIENT)
Dept: HOSPITAL 100 - LABSPEC | Age: 28
Discharge: HOME | End: 2023-09-21
Payer: COMMERCIAL

## 2023-09-21 DIAGNOSIS — Z3A.00: ICD-10-CM

## 2023-09-21 DIAGNOSIS — O09.90: Primary | ICD-10-CM

## 2023-09-21 PROCEDURE — 87081 CULTURE SCREEN ONLY: CPT

## 2023-09-21 PROCEDURE — 87077 CULTURE AEROBIC IDENTIFY: CPT

## 2023-09-21 PROCEDURE — 87186 SC STD MICRODIL/AGAR DIL: CPT

## 2023-10-07 ENCOUNTER — HOSPITAL ENCOUNTER (OUTPATIENT)
Dept: HOSPITAL 100 - WPOUT | Age: 28
Discharge: HOME | End: 2023-10-07
Payer: COMMERCIAL

## 2023-10-07 VITALS — DIASTOLIC BLOOD PRESSURE: 68 MMHG | HEART RATE: 109 BPM | OXYGEN SATURATION: 98 % | SYSTOLIC BLOOD PRESSURE: 123 MMHG

## 2023-10-07 VITALS — HEART RATE: 127 BPM | SYSTOLIC BLOOD PRESSURE: 110 MMHG | DIASTOLIC BLOOD PRESSURE: 69 MMHG

## 2023-10-07 VITALS — OXYGEN SATURATION: 97 % | HEART RATE: 109 BPM

## 2023-10-07 VITALS — HEART RATE: 125 BPM | OXYGEN SATURATION: 97 %

## 2023-10-07 VITALS — HEART RATE: 121 BPM | OXYGEN SATURATION: 98 %

## 2023-10-07 VITALS — HEART RATE: 107 BPM | OXYGEN SATURATION: 97 %

## 2023-10-07 VITALS — HEART RATE: 114 BPM | OXYGEN SATURATION: 98 %

## 2023-10-07 VITALS — OXYGEN SATURATION: 97 % | HEART RATE: 116 BPM

## 2023-10-07 VITALS — HEART RATE: 122 BPM | DIASTOLIC BLOOD PRESSURE: 70 MMHG | SYSTOLIC BLOOD PRESSURE: 113 MMHG

## 2023-10-07 VITALS — SYSTOLIC BLOOD PRESSURE: 117 MMHG | DIASTOLIC BLOOD PRESSURE: 68 MMHG | HEART RATE: 107 BPM

## 2023-10-07 VITALS — HEART RATE: 109 BPM | DIASTOLIC BLOOD PRESSURE: 70 MMHG | SYSTOLIC BLOOD PRESSURE: 116 MMHG

## 2023-10-07 VITALS — HEART RATE: 126 BPM | OXYGEN SATURATION: 95 %

## 2023-10-07 VITALS — OXYGEN SATURATION: 99 % | HEART RATE: 109 BPM

## 2023-10-07 VITALS — HEART RATE: 119 BPM | OXYGEN SATURATION: 95 %

## 2023-10-07 VITALS — OXYGEN SATURATION: 98 % | HEART RATE: 106 BPM

## 2023-10-07 VITALS — OXYGEN SATURATION: 97 % | HEART RATE: 107 BPM

## 2023-10-07 VITALS — SYSTOLIC BLOOD PRESSURE: 113 MMHG | HEART RATE: 120 BPM | DIASTOLIC BLOOD PRESSURE: 68 MMHG

## 2023-10-07 VITALS — HEART RATE: 114 BPM | OXYGEN SATURATION: 99 %

## 2023-10-07 VITALS — OXYGEN SATURATION: 99 % | HEART RATE: 113 BPM

## 2023-10-07 VITALS — DIASTOLIC BLOOD PRESSURE: 70 MMHG | SYSTOLIC BLOOD PRESSURE: 117 MMHG | HEART RATE: 111 BPM

## 2023-10-07 VITALS — OXYGEN SATURATION: 95 % | HEART RATE: 119 BPM

## 2023-10-07 VITALS — HEART RATE: 120 BPM | SYSTOLIC BLOOD PRESSURE: 109 MMHG | DIASTOLIC BLOOD PRESSURE: 70 MMHG

## 2023-10-07 VITALS — HEART RATE: 110 BPM | OXYGEN SATURATION: 98 %

## 2023-10-07 VITALS — HEART RATE: 126 BPM | DIASTOLIC BLOOD PRESSURE: 74 MMHG | SYSTOLIC BLOOD PRESSURE: 114 MMHG

## 2023-10-07 VITALS — TEMPERATURE: 98.2 F

## 2023-10-07 VITALS — SYSTOLIC BLOOD PRESSURE: 107 MMHG | DIASTOLIC BLOOD PRESSURE: 67 MMHG

## 2023-10-07 VITALS — OXYGEN SATURATION: 97 % | HEART RATE: 118 BPM

## 2023-10-07 VITALS — OXYGEN SATURATION: 94 % | TEMPERATURE: 98.06 F | HEART RATE: 117 BPM

## 2023-10-07 VITALS — HEART RATE: 113 BPM | OXYGEN SATURATION: 98 %

## 2023-10-07 VITALS — OXYGEN SATURATION: 96 % | HEART RATE: 127 BPM

## 2023-10-07 VITALS — OXYGEN SATURATION: 98 % | HEART RATE: 112 BPM

## 2023-10-07 VITALS — OXYGEN SATURATION: 98 % | HEART RATE: 131 BPM

## 2023-10-07 VITALS — HEART RATE: 112 BPM | OXYGEN SATURATION: 97 %

## 2023-10-07 VITALS — OXYGEN SATURATION: 98 % | HEART RATE: 111 BPM

## 2023-10-07 VITALS — OXYGEN SATURATION: 95 % | HEART RATE: 123 BPM

## 2023-10-07 VITALS — OXYGEN SATURATION: 98 %

## 2023-10-07 VITALS — HEART RATE: 113 BPM | OXYGEN SATURATION: 97 %

## 2023-10-07 VITALS — SYSTOLIC BLOOD PRESSURE: 128 MMHG | DIASTOLIC BLOOD PRESSURE: 78 MMHG | HEART RATE: 118 BPM

## 2023-10-07 VITALS — BODY MASS INDEX: 34.5 KG/M2

## 2023-10-07 VITALS — HEART RATE: 124 BPM | OXYGEN SATURATION: 96 %

## 2023-10-07 DIAGNOSIS — O99.891: Primary | ICD-10-CM

## 2023-10-07 DIAGNOSIS — R03.0: ICD-10-CM

## 2023-10-07 DIAGNOSIS — Z3A.38: ICD-10-CM

## 2023-10-07 DIAGNOSIS — R51.9: ICD-10-CM

## 2023-10-07 DIAGNOSIS — Z90.49: ICD-10-CM

## 2023-10-07 LAB
ALANINE AMINOTRANSFER ALT/SGPT: 17 U/L (ref 13–56)
AST(SGOT): 11 U/L (ref 15–37)
CREAT UR-MCNC: 70.2 MG/DL
DEPRECATED RDW RBC: 45.9 FL (ref 35.1–43.9)
ERYTHROCYTE [DISTWIDTH] IN BLOOD: 15.9 % (ref 11.6–14.6)
EST GLOM FILT RATE - AFR AMER: 183 ML/MIN (ref 60–?)
ESTIMATED CREATININE CLEARANCE: 141.81 ML/MIN
HCT VFR BLD AUTO: 34.8 % (ref 37–47)
HEMOGLOBIN: 10.7 G/DL (ref 12–15)
HGB BLD-MCNC: 10.7 G/DL (ref 12–15)
MCV RBC: 79.3 FL (ref 81–99)
MEAN CORP HGB CONC: 30.7 G/DL (ref 32–36)
MEAN PLATELET VOL.: 12.1 FL (ref 6.2–12)
PLATELET # BLD: 211 K/MM3 (ref 150–450)
PLATELET COUNT: 211 K/MM3 (ref 150–450)
PROT UR-MCNC: 12.6 MG/DL (ref ?–11.9)
PROT/CREAT UR: 179 MG/G CRE (ref 0–200)
RBC # BLD AUTO: 4.39 M/MM3 (ref 4.2–5.4)
RBC DISTRIBUTION WIDTH CV: 15.9 % (ref 11.6–14.6)
RBC DISTRIBUTION WIDTH SD: 45.9 FL (ref 35.1–43.9)
URATE SERPL-MCNC: 4.8 MG/DL (ref 2.6–6)
WBC # BLD AUTO: 11.1 K/MM3 (ref 4.4–11)
WHITE BLOOD COUNT: 11.1 K/MM3 (ref 4.4–11)

## 2023-10-07 PROCEDURE — 59050 FETAL MONITOR W/REPORT: CPT

## 2023-10-07 PROCEDURE — 84450 TRANSFERASE (AST) (SGOT): CPT

## 2023-10-07 PROCEDURE — 84460 ALANINE AMINO (ALT) (SGPT): CPT

## 2023-10-07 PROCEDURE — 99221 1ST HOSP IP/OBS SF/LOW 40: CPT

## 2023-10-07 PROCEDURE — 84156 ASSAY OF PROTEIN URINE: CPT

## 2023-10-07 PROCEDURE — 84550 ASSAY OF BLOOD/URIC ACID: CPT

## 2023-10-07 PROCEDURE — 82565 ASSAY OF CREATININE: CPT

## 2023-10-07 PROCEDURE — 82570 ASSAY OF URINE CREATININE: CPT

## 2023-10-07 PROCEDURE — 85027 COMPLETE CBC AUTOMATED: CPT

## 2023-10-07 PROCEDURE — 96360 HYDRATION IV INFUSION INIT: CPT

## 2023-10-07 PROCEDURE — G0378 HOSPITAL OBSERVATION PER HR: HCPCS

## 2023-10-07 PROCEDURE — 59025 FETAL NON-STRESS TEST: CPT

## 2023-10-07 PROCEDURE — 36415 COLL VENOUS BLD VENIPUNCTURE: CPT

## 2023-10-07 PROCEDURE — A4216 STERILE WATER/SALINE, 10 ML: HCPCS

## 2023-10-07 RX ADMIN — SODIUM CHLORIDE, PRESERVATIVE FREE 10 ML: 5 INJECTION INTRAVENOUS at 05:55

## 2023-10-10 ENCOUNTER — HOSPITAL ENCOUNTER (INPATIENT)
Age: 28
LOS: 1 days | Discharge: HOME | End: 2023-10-11
Payer: COMMERCIAL

## 2023-10-10 VITALS — OXYGEN SATURATION: 98 % | HEART RATE: 100 BPM

## 2023-10-10 VITALS — TEMPERATURE: 97.88 F | HEART RATE: 112 BPM | SYSTOLIC BLOOD PRESSURE: 122 MMHG | DIASTOLIC BLOOD PRESSURE: 75 MMHG

## 2023-10-10 VITALS — SYSTOLIC BLOOD PRESSURE: 132 MMHG | DIASTOLIC BLOOD PRESSURE: 75 MMHG | HEART RATE: 101 BPM | OXYGEN SATURATION: 98 %

## 2023-10-10 VITALS — HEART RATE: 107 BPM | SYSTOLIC BLOOD PRESSURE: 109 MMHG | DIASTOLIC BLOOD PRESSURE: 71 MMHG

## 2023-10-10 VITALS — SYSTOLIC BLOOD PRESSURE: 120 MMHG | HEART RATE: 116 BPM | DIASTOLIC BLOOD PRESSURE: 84 MMHG

## 2023-10-10 VITALS — DIASTOLIC BLOOD PRESSURE: 71 MMHG | SYSTOLIC BLOOD PRESSURE: 109 MMHG | HEART RATE: 103 BPM

## 2023-10-10 VITALS — HEART RATE: 112 BPM | TEMPERATURE: 97.8 F | DIASTOLIC BLOOD PRESSURE: 74 MMHG | SYSTOLIC BLOOD PRESSURE: 107 MMHG

## 2023-10-10 VITALS — OXYGEN SATURATION: 97 % | TEMPERATURE: 97.6 F | HEART RATE: 103 BPM

## 2023-10-10 VITALS — HEART RATE: 112 BPM | SYSTOLIC BLOOD PRESSURE: 133 MMHG | DIASTOLIC BLOOD PRESSURE: 63 MMHG | OXYGEN SATURATION: 100 %

## 2023-10-10 VITALS — OXYGEN SATURATION: 98 % | HEART RATE: 105 BPM

## 2023-10-10 VITALS — HEART RATE: 104 BPM | OXYGEN SATURATION: 98 %

## 2023-10-10 VITALS — DIASTOLIC BLOOD PRESSURE: 62 MMHG | SYSTOLIC BLOOD PRESSURE: 109 MMHG | HEART RATE: 93 BPM

## 2023-10-10 VITALS — HEART RATE: 104 BPM | OXYGEN SATURATION: 95 %

## 2023-10-10 VITALS — HEART RATE: 85 BPM | DIASTOLIC BLOOD PRESSURE: 70 MMHG | SYSTOLIC BLOOD PRESSURE: 124 MMHG

## 2023-10-10 VITALS — HEART RATE: 100 BPM | SYSTOLIC BLOOD PRESSURE: 116 MMHG | DIASTOLIC BLOOD PRESSURE: 65 MMHG

## 2023-10-10 VITALS — OXYGEN SATURATION: 92 % | HEART RATE: 114 BPM

## 2023-10-10 VITALS — BODY MASS INDEX: 34.4 KG/M2

## 2023-10-10 VITALS — OXYGEN SATURATION: 99 % | HEART RATE: 99 BPM

## 2023-10-10 VITALS — HEART RATE: 101 BPM | TEMPERATURE: 98.06 F | SYSTOLIC BLOOD PRESSURE: 116 MMHG | DIASTOLIC BLOOD PRESSURE: 68 MMHG

## 2023-10-10 VITALS — DIASTOLIC BLOOD PRESSURE: 68 MMHG | SYSTOLIC BLOOD PRESSURE: 112 MMHG | HEART RATE: 97 BPM

## 2023-10-10 VITALS — SYSTOLIC BLOOD PRESSURE: 122 MMHG | HEART RATE: 118 BPM | DIASTOLIC BLOOD PRESSURE: 76 MMHG

## 2023-10-10 VITALS — HEART RATE: 112 BPM | OXYGEN SATURATION: 98 %

## 2023-10-10 VITALS — DIASTOLIC BLOOD PRESSURE: 65 MMHG | SYSTOLIC BLOOD PRESSURE: 114 MMHG | HEART RATE: 107 BPM

## 2023-10-10 VITALS — HEART RATE: 103 BPM | SYSTOLIC BLOOD PRESSURE: 119 MMHG | DIASTOLIC BLOOD PRESSURE: 67 MMHG

## 2023-10-10 VITALS
SYSTOLIC BLOOD PRESSURE: 105 MMHG | TEMPERATURE: 98.06 F | RESPIRATION RATE: 18 BRPM | HEART RATE: 93 BPM | OXYGEN SATURATION: 99 % | DIASTOLIC BLOOD PRESSURE: 57 MMHG

## 2023-10-10 VITALS — TEMPERATURE: 97.52 F

## 2023-10-10 VITALS — HEART RATE: 118 BPM | SYSTOLIC BLOOD PRESSURE: 130 MMHG | DIASTOLIC BLOOD PRESSURE: 63 MMHG

## 2023-10-10 VITALS — HEART RATE: 123 BPM | OXYGEN SATURATION: 99 %

## 2023-10-10 VITALS — OXYGEN SATURATION: 98 % | HEART RATE: 126 BPM

## 2023-10-10 VITALS — OXYGEN SATURATION: 98 % | HEART RATE: 101 BPM

## 2023-10-10 VITALS — HEART RATE: 105 BPM | SYSTOLIC BLOOD PRESSURE: 117 MMHG | DIASTOLIC BLOOD PRESSURE: 59 MMHG

## 2023-10-10 VITALS — HEART RATE: 105 BPM | OXYGEN SATURATION: 98 %

## 2023-10-10 VITALS — TEMPERATURE: 97.88 F

## 2023-10-10 VITALS — OXYGEN SATURATION: 97 % | HEART RATE: 104 BPM

## 2023-10-10 VITALS — DIASTOLIC BLOOD PRESSURE: 87 MMHG | SYSTOLIC BLOOD PRESSURE: 130 MMHG

## 2023-10-10 VITALS — DIASTOLIC BLOOD PRESSURE: 70 MMHG | HEART RATE: 96 BPM | SYSTOLIC BLOOD PRESSURE: 120 MMHG

## 2023-10-10 VITALS — HEART RATE: 120 BPM | SYSTOLIC BLOOD PRESSURE: 136 MMHG | DIASTOLIC BLOOD PRESSURE: 75 MMHG

## 2023-10-10 VITALS — OXYGEN SATURATION: 98 % | HEART RATE: 102 BPM

## 2023-10-10 VITALS — HEART RATE: 113 BPM | OXYGEN SATURATION: 100 % | SYSTOLIC BLOOD PRESSURE: 137 MMHG | DIASTOLIC BLOOD PRESSURE: 71 MMHG

## 2023-10-10 VITALS — SYSTOLIC BLOOD PRESSURE: 114 MMHG | DIASTOLIC BLOOD PRESSURE: 71 MMHG

## 2023-10-10 VITALS — HEART RATE: 109 BPM | OXYGEN SATURATION: 98 %

## 2023-10-10 VITALS — HEART RATE: 115 BPM | OXYGEN SATURATION: 99 %

## 2023-10-10 VITALS — DIASTOLIC BLOOD PRESSURE: 83 MMHG | SYSTOLIC BLOOD PRESSURE: 119 MMHG

## 2023-10-10 VITALS — HEART RATE: 105 BPM | OXYGEN SATURATION: 97 %

## 2023-10-10 VITALS — SYSTOLIC BLOOD PRESSURE: 124 MMHG | DIASTOLIC BLOOD PRESSURE: 80 MMHG

## 2023-10-10 VITALS — OXYGEN SATURATION: 100 % | HEART RATE: 122 BPM

## 2023-10-10 VITALS — HEART RATE: 93 BPM | DIASTOLIC BLOOD PRESSURE: 55 MMHG | SYSTOLIC BLOOD PRESSURE: 102 MMHG

## 2023-10-10 VITALS — SYSTOLIC BLOOD PRESSURE: 109 MMHG | DIASTOLIC BLOOD PRESSURE: 71 MMHG | TEMPERATURE: 98.5 F | HEART RATE: 83 BPM

## 2023-10-10 DIAGNOSIS — B95.1: ICD-10-CM

## 2023-10-10 DIAGNOSIS — O99.213: ICD-10-CM

## 2023-10-10 DIAGNOSIS — Z30.2: ICD-10-CM

## 2023-10-10 DIAGNOSIS — E66.8: ICD-10-CM

## 2023-10-10 DIAGNOSIS — O34.219: ICD-10-CM

## 2023-10-10 DIAGNOSIS — F41.9: ICD-10-CM

## 2023-10-10 DIAGNOSIS — Z3A.39: ICD-10-CM

## 2023-10-10 LAB
ALANINE AMINOTRANSFER ALT/SGPT: 17 U/L (ref 13–56)
AST(SGOT): 15 U/L (ref 15–37)
DEPRECATED RDW RBC: 45.5 FL (ref 35.1–43.9)
ERYTHROCYTE [DISTWIDTH] IN BLOOD: 15.9 % (ref 11.6–14.6)
EST GLOM FILT RATE - AFR AMER: 124 ML/MIN (ref 60–?)
ESTIMATED CREATININE CLEARANCE: 100.45 ML/MIN
HCT VFR BLD AUTO: 34.7 % (ref 37–47)
HEMOGLOBIN: 11 G/DL (ref 12–15)
HGB BLD-MCNC: 11 G/DL (ref 12–15)
IMMATURE GRANULOCYTES COUNT: 0.33 X10^3/UL (ref 0–0)
MCV RBC: 79.4 FL (ref 81–99)
MEAN CORP HGB CONC: 31.7 G/DL (ref 32–36)
MEAN PLATELET VOL.: 12.1 FL (ref 6.2–12)
NRBC FLAGGED BY ANALYZER: 0 % (ref 0–5)
PLATELET # BLD: 202 K/MM3 (ref 150–450)
PLATELET COUNT: 202 K/MM3 (ref 150–450)
RBC # BLD AUTO: 4.37 M/MM3 (ref 4.2–5.4)
RBC DISTRIBUTION WIDTH CV: 15.9 % (ref 11.6–14.6)
RBC DISTRIBUTION WIDTH SD: 45.5 FL (ref 35.1–43.9)
URATE SERPL-MCNC: 5.9 MG/DL (ref 2.6–6)
WBC # BLD AUTO: 10.2 K/MM3 (ref 4.4–11)
WHITE BLOOD COUNT: 10.2 K/MM3 (ref 4.4–11)

## 2023-10-10 PROCEDURE — 84450 TRANSFERASE (AST) (SGOT): CPT

## 2023-10-10 PROCEDURE — 86850 RBC ANTIBODY SCREEN: CPT

## 2023-10-10 PROCEDURE — 82565 ASSAY OF CREATININE: CPT

## 2023-10-10 PROCEDURE — 86901 BLOOD TYPING SEROLOGIC RH(D): CPT

## 2023-10-10 PROCEDURE — 59050 FETAL MONITOR W/REPORT: CPT

## 2023-10-10 PROCEDURE — 99221 1ST HOSP IP/OBS SF/LOW 40: CPT

## 2023-10-10 PROCEDURE — 84460 ALANINE AMINO (ALT) (SGPT): CPT

## 2023-10-10 PROCEDURE — 86900 BLOOD TYPING SEROLOGIC ABO: CPT

## 2023-10-10 PROCEDURE — 85025 COMPLETE CBC W/AUTO DIFF WBC: CPT

## 2023-10-10 PROCEDURE — 86780 TREPONEMA PALLIDUM: CPT

## 2023-10-10 PROCEDURE — 84550 ASSAY OF BLOOD/URIC ACID: CPT

## 2023-10-10 PROCEDURE — 82962 GLUCOSE BLOOD TEST: CPT

## 2023-10-10 PROCEDURE — 59025 FETAL NON-STRESS TEST: CPT

## 2023-10-10 PROCEDURE — G0378 HOSPITAL OBSERVATION PER HR: HCPCS

## 2023-10-11 VITALS
RESPIRATION RATE: 14 BRPM | HEART RATE: 98 BPM | DIASTOLIC BLOOD PRESSURE: 64 MMHG | TEMPERATURE: 97.8 F | SYSTOLIC BLOOD PRESSURE: 108 MMHG | OXYGEN SATURATION: 96 %

## 2023-10-11 VITALS
OXYGEN SATURATION: 96 % | DIASTOLIC BLOOD PRESSURE: 69 MMHG | HEART RATE: 93 BPM | RESPIRATION RATE: 16 BRPM | TEMPERATURE: 98.06 F | SYSTOLIC BLOOD PRESSURE: 114 MMHG

## 2023-10-11 VITALS
OXYGEN SATURATION: 96 % | HEART RATE: 95 BPM | RESPIRATION RATE: 16 BRPM | SYSTOLIC BLOOD PRESSURE: 101 MMHG | DIASTOLIC BLOOD PRESSURE: 62 MMHG | TEMPERATURE: 98.7 F

## 2023-10-11 VITALS
HEART RATE: 96 BPM | SYSTOLIC BLOOD PRESSURE: 113 MMHG | OXYGEN SATURATION: 98 % | DIASTOLIC BLOOD PRESSURE: 70 MMHG | RESPIRATION RATE: 15 BRPM | TEMPERATURE: 97.6 F

## 2023-10-11 VITALS
HEART RATE: 90 BPM | SYSTOLIC BLOOD PRESSURE: 122 MMHG | TEMPERATURE: 97.6 F | OXYGEN SATURATION: 98 % | RESPIRATION RATE: 17 BRPM | DIASTOLIC BLOOD PRESSURE: 88 MMHG

## 2023-10-19 ENCOUNTER — HOSPITAL ENCOUNTER (OUTPATIENT)
Dept: HOSPITAL 100 - LABSPEC | Age: 28
Discharge: HOME | End: 2023-10-19
Payer: COMMERCIAL

## 2023-10-19 DIAGNOSIS — R30.0: Primary | ICD-10-CM

## 2023-10-19 PROCEDURE — 87088 URINE BACTERIA CULTURE: CPT

## 2023-10-19 PROCEDURE — 87086 URINE CULTURE/COLONY COUNT: CPT

## 2023-11-21 ENCOUNTER — HOSPITAL ENCOUNTER (EMERGENCY)
Age: 28
Discharge: HOME | End: 2023-11-21
Payer: COMMERCIAL

## 2023-11-21 VITALS
OXYGEN SATURATION: 98 % | SYSTOLIC BLOOD PRESSURE: 99 MMHG | DIASTOLIC BLOOD PRESSURE: 72 MMHG | TEMPERATURE: 97.8 F | RESPIRATION RATE: 16 BRPM | HEART RATE: 93 BPM

## 2023-11-21 VITALS — BODY MASS INDEX: 30.6 KG/M2

## 2023-11-21 DIAGNOSIS — N39.0: Primary | ICD-10-CM

## 2023-11-21 LAB
KETONE-DIPSTICK: 5 MG/DL
LEUKOCYTE ESTERASE UR QL STRIP: 500 /UL
MUCOUS THREADS URNS QL MICRO: (no result) /HPF
PROT UR QL STRIP.AUTO: 15 MG/DL
RBC UR QL: (no result) /HPF (ref 0–5)
RBC UR QL: 25 /UL
SP GR UR: 1.02 (ref 1–1.03)
SQUAMOUS URNS QL MICRO: (no result) /HPF (ref 5–10)
TRANSITIONAL EPITHELIAL - UR: (no result) /HPF (ref 0–5)
URINE PRESERVATIVE: (no result)

## 2023-11-21 PROCEDURE — 99282 EMERGENCY DEPT VISIT SF MDM: CPT

## 2023-11-21 PROCEDURE — 81001 URINALYSIS AUTO W/SCOPE: CPT

## 2023-11-21 PROCEDURE — 87086 URINE CULTURE/COLONY COUNT: CPT

## 2023-11-28 ENCOUNTER — HOSPITAL ENCOUNTER (OUTPATIENT)
Dept: HOSPITAL 100 - LABSPEC | Age: 28
Discharge: HOME | End: 2023-11-28
Payer: COMMERCIAL

## 2023-11-28 DIAGNOSIS — Z12.4: Primary | ICD-10-CM

## 2023-11-28 PROCEDURE — G0145 SCR C/V CYTO,THINLAYER,RESCR: HCPCS

## 2023-11-28 PROCEDURE — 88175 CYTOPATH C/V AUTO FLUID REDO: CPT

## 2024-01-30 ENCOUNTER — APPOINTMENT (OUTPATIENT)
Dept: UROLOGY | Facility: CLINIC | Age: 29
End: 2024-01-30
Payer: COMMERCIAL

## 2024-02-12 ENCOUNTER — HOSPITAL ENCOUNTER (OUTPATIENT)
Dept: HOSPITAL 100 - LABSPEC | Age: 29
Discharge: HOME | End: 2024-02-12
Payer: COMMERCIAL

## 2024-02-12 DIAGNOSIS — R87.612: Primary | ICD-10-CM

## 2024-02-12 PROCEDURE — 88341 IMHCHEM/IMCYTCHM EA ADD ANTB: CPT

## 2024-02-12 PROCEDURE — 88305 TISSUE EXAM BY PATHOLOGIST: CPT

## 2024-02-12 PROCEDURE — 88342 IMHCHEM/IMCYTCHM 1ST ANTB: CPT

## 2024-02-12 NOTE — IMM_PTH
PATHOLOGY RESULTS

PATIENT: VANGIE JOHNSTON             ACCT #:[id number]  LOC: [facility]    U#:[id number]
                                       AGE/SX: 28/F         ROOM:           RE2024
REG DR: Dr. Leslie Veras DO   : 1995      BED:            DIS: 2024

--------------------------------------------------------------------------------------------

SPEC #: [id number]        RECD: 24 11:05    STATUS:  RUFINO        PIO #: [id number]
                        NEERAJ: 24 00:00    SUBM DR: Leslie Kelly

DEPT: IMMUNOHISTOCHEMISTRY                      RECD BY: Linh Gomez

ENTERED: 24 11:06 SP TYPE: IMMUNO     OTHR DR: No Primary Care Phys

Tissues:    Uterine cervix, NOS
            Uterine cervix, NOS
Procedures: p16 (initial)
            KI-67 (add)

--------------------------------------------------------------------------------------------

PHYSICIAN & INSTITUTION

[place] [id number] 

--------------------------------------------------------------------------------------------

SPECIMEN INFORMATION:

Tissue Source:  A - Cervix, 4 o'clock, C - Cervix, 9 o'clock  
Clinical Info:  AUGIE  
Specimen Number:    A & C  
CPT code: 88342 x2, 88341 x2  
  
METHODOLOGY:  
Deparaffinized sections of prefer/formalin-fixed tissue or PAP/DQ stained slides are 
incubated with monoclonal/polyclonal antibodies/oligonucleotide probes.  Localization is 
made via biotin free  immunoperoxidase method.  Appropriate controls are performed and 
reacted as expected. Results on target cell population are indicated in the following table:  

--------------------------------------------------------------------------------------------

RESULTS:

ANTIBODY / CLONE                      RESULT  
Block A  
P16  (E6H4)                 positive, focal patchy  
Ki-67  (30-9)                  negative, low to moderate  
  
Block C  
P16  (E6H4)                 positive, focal minimal patchy staining  
Ki-67  (30-9)                  positive, very low  
  
These tests were developed and their performance characteristics determined by [facility].  They may not have been cleared or approved by the U.[place]. Food 
and Drug Administration.  The FDA has determined that such clearance or approval is not 
necessary.  
The above immunohistochemical/dualISH markers are ordered and reviewed by the Pathologist.   

--------------------------------------------------------------------------------------------

INTERPRETATION:

A.  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   
C.  Cervix, 9 o'clock, biopsy:  Focal changes consistent with HPV cytopathic effect.  
  
SJ:coni  2/15/2024

## 2024-02-12 NOTE — CER_PTH
PATHOLOGY RESULTS

PATIENT: VANGIE JOHNSTON             ACCT #:[id number]  [facility]: [facility]    U#:[id number]
                                       AGE/SX: 28/F         ROOM:           RE2024
REG DR: Dr. Leslie Veras DO   : 1995      BED:            DIS: 2024

--------------------------------------------------------------------------------------------

SPEC #:          RECD: 24 15:59    STATUS:  RUFINO        PIO #: [id number]
                        NEERAJ: 24 14:45    SUBM DR: Leslie Kelly

DEPT: SURGICAL PATHOLOGY                        RECD BY: Nora Baker

ENTERED: 24 08:29 SP TYPE: CERV       OTHR DR: No Primary Care Phys

Tissues:    Uterine cervix, NOS
            Endocervical
            Uterine cervix, NOS
Procedures: Surgery Specimen Level IV

--------------------------------------------------------------------------------------------

HEADER

OPERATION:  Colposcopy  
PRE-OP DIAGNOSIS: LGSIL  
TISSUE SUBMITTED: A - 4 o'clock, B - Endocervical curettings, C - 6 o'clock   

--------------------------------------------------------------------------------------------

MICROSCOPIC DIAGNOSIS

A.  Cervix, 4 o'clock, biopsy:  
    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  
    Acute and chronic inflammation.  
    See comment.  
B.  Endocervical curettings:  
    Scant fragments of benign glandular epithelium.  
    Negative for dysplasia.  
C.  Cervix, 9 o'clock, biopsy:  
    Focal changes consistent with HPV cytopathic effects.  
    Focal chronic inflammation.  
    See comment.  
  
SJ:rg  2024  

--------------------------------------------------------------------------------------------

COMMENT

A & C.  Immunohistochemistry ([id number]) for surrogate HPV marker (p16) supports the above 
diagnosis.  
  
Case has been reviewed in consultation with Dr. Puentes who concurs with the above diagnosis.  
IDC:AM 

--------------------------------------------------------------------------------------------

MICROSCOPIC DESCRIPTION

Slides are reviewed.  

--------------------------------------------------------------------------------------------

GROSS DESCRIPTION

A - Received in fixative is one container labeled with the patient's name and designated  4 
o'clock.   The specimen consists of one irregular fragment of light tan soft tissue that 
measures 0.5 x 0.3 x 0.1 cm.  The specimen is totally submitted in one cassette.  
B - Received in fixative is one container labeled with the patient's name and designated 
 ECC.   The specimen consists of a scant amount of soft tissue.  The specimen is totally 
submitted for cell block preparation.  
C - Received in fixative is one container labeled with the patient's name and designated  6 
o'clock.   The specimen consists of two irregular fragments of light tan soft tissue that in 
aggregate measure 0.7 x 0.5 x 0.1 cm.  The specimen is totally submitted in one cassette.  / 
AM:coni  2024  TC:5  
CPT: 88305 x3

## 2024-07-29 ENCOUNTER — LAB REQUISITION (OUTPATIENT)
Dept: LAB | Facility: HOSPITAL | Age: 29
End: 2024-07-29
Payer: COMMERCIAL

## 2024-07-29 DIAGNOSIS — R30.0 DYSURIA: ICD-10-CM

## 2024-07-29 PROCEDURE — 87086 URINE CULTURE/COLONY COUNT: CPT

## 2024-07-31 LAB — BACTERIA UR CULT: NORMAL

## 2024-09-16 ENCOUNTER — APPOINTMENT (OUTPATIENT)
Dept: CARDIOLOGY | Facility: CLINIC | Age: 29
End: 2024-09-16
Payer: COMMERCIAL

## 2024-10-28 ENCOUNTER — APPOINTMENT (OUTPATIENT)
Dept: CARDIOLOGY | Facility: CLINIC | Age: 29
End: 2024-10-28
Payer: COMMERCIAL

## 2024-10-28 ENCOUNTER — ANCILLARY PROCEDURE (OUTPATIENT)
Dept: URGENT CARE | Age: 29
End: 2024-10-28
Payer: COMMERCIAL

## 2024-10-28 ENCOUNTER — OFFICE VISIT (OUTPATIENT)
Dept: URGENT CARE | Age: 29
End: 2024-10-28
Payer: COMMERCIAL

## 2024-10-28 VITALS
OXYGEN SATURATION: 99 % | BODY MASS INDEX: 30.04 KG/M2 | WEIGHT: 175 LBS | HEART RATE: 89 BPM | RESPIRATION RATE: 16 BRPM | DIASTOLIC BLOOD PRESSURE: 71 MMHG | TEMPERATURE: 97.7 F | SYSTOLIC BLOOD PRESSURE: 104 MMHG

## 2024-10-28 DIAGNOSIS — S99.912A LEFT ANKLE INJURY, INITIAL ENCOUNTER: Primary | ICD-10-CM

## 2024-10-28 DIAGNOSIS — S99.912A LEFT ANKLE INJURY, INITIAL ENCOUNTER: ICD-10-CM

## 2024-10-28 DIAGNOSIS — S99.922A FOOT INJURY, LEFT, INITIAL ENCOUNTER: ICD-10-CM

## 2024-10-28 PROCEDURE — 73630 X-RAY EXAM OF FOOT: CPT | Mod: LEFT SIDE

## 2024-10-28 PROCEDURE — 73610 X-RAY EXAM OF ANKLE: CPT | Mod: LEFT SIDE

## 2024-10-28 PROCEDURE — 1036F TOBACCO NON-USER: CPT

## 2024-10-28 PROCEDURE — 99213 OFFICE O/P EST LOW 20 MIN: CPT

## 2024-10-28 ASSESSMENT — ENCOUNTER SYMPTOMS
ARTHRALGIAS: 1
CONSTITUTIONAL NEGATIVE: 1
JOINT SWELLING: 1

## 2024-12-05 ENCOUNTER — HOSPITAL ENCOUNTER (OUTPATIENT)
Dept: HOSPITAL 100 - BWCLAB | Age: 29
Discharge: HOME | End: 2024-12-05
Payer: COMMERCIAL

## 2024-12-05 DIAGNOSIS — R10.2: ICD-10-CM

## 2024-12-05 DIAGNOSIS — Z87.42: Primary | ICD-10-CM

## 2024-12-05 LAB — HCG SERPL QL: < 1 MIU/ML (ref 1–3)

## 2024-12-05 PROCEDURE — G0145 SCR C/V CYTO,THINLAYER,RESCR: HCPCS

## 2024-12-05 PROCEDURE — 87205 SMEAR GRAM STAIN: CPT

## 2024-12-05 PROCEDURE — 87070 CULTURE OTHR SPECIMN AEROBIC: CPT

## 2024-12-05 PROCEDURE — 36415 COLL VENOUS BLD VENIPUNCTURE: CPT

## 2024-12-05 PROCEDURE — 84702 CHORIONIC GONADOTROPIN TEST: CPT

## 2024-12-05 PROCEDURE — 88175 CYTOPATH C/V AUTO FLUID REDO: CPT

## 2024-12-22 ENCOUNTER — OFFICE VISIT (OUTPATIENT)
Dept: URGENT CARE | Age: 29
End: 2024-12-22
Payer: COMMERCIAL

## 2024-12-22 VITALS
TEMPERATURE: 98.1 F | SYSTOLIC BLOOD PRESSURE: 106 MMHG | WEIGHT: 170 LBS | BODY MASS INDEX: 29.18 KG/M2 | RESPIRATION RATE: 16 BRPM | DIASTOLIC BLOOD PRESSURE: 75 MMHG | OXYGEN SATURATION: 96 % | HEART RATE: 109 BPM

## 2024-12-22 DIAGNOSIS — K04.7 DENTAL INFECTION: Primary | ICD-10-CM

## 2024-12-22 PROCEDURE — 1036F TOBACCO NON-USER: CPT | Performed by: NURSE PRACTITIONER

## 2024-12-22 PROCEDURE — 99213 OFFICE O/P EST LOW 20 MIN: CPT | Performed by: NURSE PRACTITIONER

## 2024-12-22 RX ORDER — AMOXICILLIN AND CLAVULANATE POTASSIUM 875; 125 MG/1; MG/1
1 TABLET, FILM COATED ORAL 2 TIMES DAILY
Qty: 20 TABLET | Refills: 0 | Status: SHIPPED | OUTPATIENT
Start: 2024-12-22 | End: 2025-01-01

## 2024-12-22 ASSESSMENT — ENCOUNTER SYMPTOMS
HEADACHES: 1
SINUS PRESSURE: 0
FEVER: 1
SORE THROAT: 0
SINUS PAIN: 0
NAUSEA: 1
COUGH: 0

## 2024-12-23 NOTE — PROGRESS NOTES
Subjective   Patient ID: Maricel Bloom is a 29 y.o. female. They present today with a chief complaint of Dental Pain.    History of Present Illness  Patient presents with concern for dental infection. She states she had a tooth pulled 4-day ago and the first 2 days she had typical discomfort. But the next 2 days she had more significant pain, swelling, discomfort.       Dental Pain  Associated symptoms: fever and headaches        Past Medical History  Allergies as of 12/22/2024 - Reviewed 12/22/2024   Allergen Reaction Noted    Metoprolol Shortness of breath, Hives, and Rash 06/29/2015       (Not in a hospital admission)       History reviewed. No pertinent past medical history.    History reviewed. No pertinent surgical history.     reports that she has never smoked. She has never used smokeless tobacco. She reports that she does not use drugs.    Review of Systems  Review of Systems   Constitutional:  Positive for fever.   HENT:  Positive for dental problem. Negative for sinus pressure, sinus pain and sore throat.    Respiratory:  Negative for cough.    Gastrointestinal:  Positive for nausea.   Neurological:  Positive for headaches.                                  Objective    Vitals:    12/22/24 1939   BP: 106/75   Pulse: 109   Resp: 16   Temp: 36.7 °C (98.1 °F)   SpO2: 96%   Weight: 77.1 kg (170 lb)     No LMP recorded.    Physical Exam  Vitals reviewed.   Constitutional:       Appearance: Normal appearance.   HENT:      Head: Normocephalic and atraumatic.      Mouth/Throat:      Dentition: Dental tenderness, gingival swelling and dental abscesses present.        Comments: Erythema, purulent fluid noted where the tooth was removed, but also at the next tooth. No bleeding noted, no posterior phanygeal swelling noted.   Cardiovascular:      Rate and Rhythm: Normal rate.   Pulmonary:      Effort: Pulmonary effort is normal.   Skin:     General: Skin is warm and dry.      Findings: No erythema or rash.    Neurological:      Mental Status: She is alert.         Procedures    Point of Care Test & Imaging Results from this visit  No results found for this visit on 12/22/24.   No results found.    Diagnostic study results (if any) were reviewed by LIZETH Schroeder.    Assessment/Plan   Allergies, medications, history, and pertinent labs/EKGs/Imaging reviewed by LIZETH Schroeder. Provided rx for ATB, advised close monitoring of symptoms and go to the ER for any increased swelling, fevers, headache, body aches, n/v as these can be signs of severe infection. Follow up with dentist tomorrow morning for further evaluation and treatment.     Medical Decision Making  At time of discharge patient was clinically well-appearing and HDS for outpatient management. The patient and/or family was educated regarding diagnosis, supportive care, OTC and Rx medications. The patient and/or family was given the opportunity to ask questions prior to discharge.  They verbalized understanding of my discussion of the plans for treatment, expected course, indications to return to  or seek further evaluation in ED, and the need for timely follow up as directed.   They were provided with a work/school excuse if requested.      Orders and Diagnoses  Diagnoses and all orders for this visit:  Dental infection  -     amoxicillin-pot clavulanate (Augmentin) 875-125 mg tablet; Take 1 tablet by mouth 2 times a day for 10 days.      Medical Admin Record      Patient disposition: Home    Electronically signed by LIZETH Schroeder  7:56 PM

## 2025-06-27 ENCOUNTER — HOSPITAL ENCOUNTER (OUTPATIENT)
Age: 30
Discharge: HOME | End: 2025-06-27
Payer: COMMERCIAL

## 2025-06-27 DIAGNOSIS — N91.2: Primary | ICD-10-CM

## 2025-06-27 LAB — HCG SERPL QL: < 1 MIU/ML

## 2025-06-27 PROCEDURE — 84702 CHORIONIC GONADOTROPIN TEST: CPT

## 2025-06-27 PROCEDURE — 36415 COLL VENOUS BLD VENIPUNCTURE: CPT

## 2025-07-04 ENCOUNTER — HOSPITAL ENCOUNTER (EMERGENCY)
Age: 30
Discharge: HOME | End: 2025-07-04
Payer: COMMERCIAL

## 2025-07-04 VITALS
DIASTOLIC BLOOD PRESSURE: 78 MMHG | RESPIRATION RATE: 16 BRPM | HEART RATE: 70 BPM | OXYGEN SATURATION: 100 % | SYSTOLIC BLOOD PRESSURE: 124 MMHG | TEMPERATURE: 97.9 F

## 2025-07-04 VITALS
TEMPERATURE: 98.42 F | DIASTOLIC BLOOD PRESSURE: 82 MMHG | HEART RATE: 84 BPM | OXYGEN SATURATION: 99 % | SYSTOLIC BLOOD PRESSURE: 122 MMHG | RESPIRATION RATE: 16 BRPM
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
## 2025-07-04 DIAGNOSIS — Z90.49: ICD-10-CM

## 2025-07-04 DIAGNOSIS — S60.811A: ICD-10-CM

## 2025-07-04 DIAGNOSIS — S63.501A: ICD-10-CM

## 2025-07-04 DIAGNOSIS — S93.402A: Primary | ICD-10-CM

## 2025-07-04 DIAGNOSIS — Z23: ICD-10-CM

## 2025-07-04 PROCEDURE — 99283 EMERGENCY DEPT VISIT LOW MDM: CPT

## 2025-07-04 PROCEDURE — 73610 X-RAY EXAM OF ANKLE: CPT

## 2025-07-04 PROCEDURE — 73110 X-RAY EXAM OF WRIST: CPT

## 2025-07-07 ENCOUNTER — ANCILLARY PROCEDURE (OUTPATIENT)
Dept: URGENT CARE | Age: 30
End: 2025-07-07
Payer: COMMERCIAL

## 2025-07-07 ENCOUNTER — OFFICE VISIT (OUTPATIENT)
Dept: URGENT CARE | Age: 30
End: 2025-07-07
Payer: COMMERCIAL

## 2025-07-07 VITALS
TEMPERATURE: 98.2 F | RESPIRATION RATE: 17 BRPM | OXYGEN SATURATION: 100 % | HEART RATE: 93 BPM | BODY MASS INDEX: 30.9 KG/M2 | SYSTOLIC BLOOD PRESSURE: 114 MMHG | WEIGHT: 180 LBS | DIASTOLIC BLOOD PRESSURE: 77 MMHG

## 2025-07-07 DIAGNOSIS — S59.901A ELBOW INJURY, RIGHT, INITIAL ENCOUNTER: Primary | ICD-10-CM

## 2025-07-07 DIAGNOSIS — S59.901A ELBOW INJURY, RIGHT, INITIAL ENCOUNTER: ICD-10-CM

## 2025-07-07 PROCEDURE — 73080 X-RAY EXAM OF ELBOW: CPT | Mod: RIGHT SIDE | Performed by: PHYSICIAN ASSISTANT

## 2025-07-07 ASSESSMENT — ENCOUNTER SYMPTOMS
COUGH: 0
WEAKNESS: 0
NAUSEA: 0
FEVER: 0
VOMITING: 0
ABDOMINAL PAIN: 0
JOINT SWELLING: 0
CHEST TIGHTNESS: 0
NUMBNESS: 0
CHILLS: 0
SHORTNESS OF BREATH: 0
FATIGUE: 0
ARTHRALGIAS: 1
DIARRHEA: 0

## 2025-07-07 NOTE — PROGRESS NOTES
Subjective   Patient ID: Maricel Bloom is a 29 y.o. female. They present today with a chief complaint of Elbow Injury (Fell on sidewalk 07/03/25, 8/10, comes and goes).    History of Present Illness  Pt presented with right elbow pain. Fell on 7/3 and went to ER  after injury. Reports elbow not really bothered her back to that time so only addressed ankle and foot pain with provider and X-ray cleared for acute fx. Now feels elbow is really sore and tender on palpation. She is taking Tylenol as needed w/o improvement.           Past Medical History  Allergies as of 07/07/2025 - Reviewed 07/07/2025   Allergen Reaction Noted    Metoprolol Shortness of breath, Hives, and Rash 06/29/2015       Prescriptions Prior to Admission[1]     Medical History[2]    Surgical History[3]     reports that she has never smoked. She has never used smokeless tobacco. She reports that she does not use drugs.    Review of Systems  Review of Systems   Constitutional:  Negative for chills, fatigue and fever.   Respiratory:  Negative for cough, chest tightness and shortness of breath.    Cardiovascular:  Negative for chest pain.   Gastrointestinal:  Negative for abdominal pain, diarrhea, nausea and vomiting.   Musculoskeletal:  Positive for arthralgias. Negative for joint swelling.   Neurological:  Negative for weakness and numbness.                                  Objective    Vitals:    07/07/25 1547   BP: 114/77   Pulse: 93   Resp: 17   Temp: 36.8 °C (98.2 °F)   SpO2: 100%   Weight: 81.6 kg (180 lb)     No LMP recorded.    Physical Exam  Constitutional:       Appearance: Normal appearance.   HENT:      Head: Normocephalic and atraumatic.      Nose: Nose normal.   Cardiovascular:      Rate and Rhythm: Normal rate and regular rhythm.      Heart sounds: No murmur heard.  Pulmonary:      Effort: Pulmonary effort is normal.      Breath sounds: Normal breath sounds.   Abdominal:      General: Abdomen is flat.      Palpations: Abdomen is soft.    Musculoskeletal:      Right elbow: No swelling, deformity, effusion or lacerations. Decreased range of motion. Tenderness present in olecranon process.   Skin:     General: Skin is warm and dry.   Neurological:      Mental Status: She is alert and oriented to person, place, and time.   Psychiatric:         Mood and Affect: Mood normal.         Procedures    Point of Care Test & Imaging Results from this visit  No results found for this visit on 07/07/25.   Imaging  XR elbow right 3+ views  Result Date: 7/7/2025  No acute osseous abnormality.     MACRO: None.   Signed by: Vamshi Stacy 7/7/2025 4:48 PM Dictation workstation:   [id number]      Cardiology, Vascular, and Other Imaging  No other imaging results found for the past 2 days      Diagnostic study results (if any) were reviewed by Brandi Demarco PA-C.    Assessment/Plan   Allergies, medications, history, and pertinent labs/EKGs/Imaging reviewed by Brandi Demarco PA-C.     Medical Decision Making  NAD, tenderness along the olecranon process w/o swelling or skin changes  X-ray no acute findings  Suspicious for soft tissue injury    Orders and Diagnoses  Diagnoses and all orders for this visit:  Elbow injury, right, initial encounter  -     XR elbow right 3+ views; Future      Medical Admin Record      Patient disposition: Home    Electronically signed by Brandi Demarco PA-C  5:16 PM           [1] (Not in a hospital admission)   [2] History reviewed. No pertinent past medical history.  [3] History reviewed. No pertinent surgical history.